# Patient Record
Sex: MALE | Race: WHITE | NOT HISPANIC OR LATINO | Employment: FULL TIME | URBAN - METROPOLITAN AREA
[De-identification: names, ages, dates, MRNs, and addresses within clinical notes are randomized per-mention and may not be internally consistent; named-entity substitution may affect disease eponyms.]

---

## 2021-11-26 ENCOUNTER — OFFICE VISIT (OUTPATIENT)
Dept: URGENT CARE | Facility: CLINIC | Age: 31
End: 2021-11-26
Payer: COMMERCIAL

## 2021-11-26 VITALS
TEMPERATURE: 97.8 F | SYSTOLIC BLOOD PRESSURE: 118 MMHG | RESPIRATION RATE: 18 BRPM | OXYGEN SATURATION: 98 % | DIASTOLIC BLOOD PRESSURE: 56 MMHG | HEART RATE: 88 BPM

## 2021-11-26 DIAGNOSIS — J06.9 ACUTE UPPER RESPIRATORY INFECTION: Primary | ICD-10-CM

## 2021-11-26 PROCEDURE — 99213 OFFICE O/P EST LOW 20 MIN: CPT | Performed by: NURSE PRACTITIONER

## 2021-11-26 PROCEDURE — 0241U HB NFCT DS VIR RESP RNA 4 TRGT: CPT | Performed by: NURSE PRACTITIONER

## 2021-11-28 ENCOUNTER — HOSPITAL ENCOUNTER (EMERGENCY)
Facility: HOSPITAL | Age: 31
Discharge: HOME | End: 2021-11-28
Attending: EMERGENCY MEDICINE | Admitting: EMERGENCY MEDICINE
Payer: COMMERCIAL

## 2021-11-28 VITALS
HEART RATE: 96 BPM | TEMPERATURE: 98.6 F | DIASTOLIC BLOOD PRESSURE: 60 MMHG | OXYGEN SATURATION: 98 % | RESPIRATION RATE: 18 BRPM | SYSTOLIC BLOOD PRESSURE: 149 MMHG

## 2021-11-28 DIAGNOSIS — Z13.220 SCREENING FOR HYPERLIPIDEMIA: ICD-10-CM

## 2021-11-28 DIAGNOSIS — R00.2 PALPITATIONS: Primary | ICD-10-CM

## 2021-11-28 DIAGNOSIS — R10.13 EPIGASTRIC DISCOMFORT: ICD-10-CM

## 2021-11-28 DIAGNOSIS — R42 LIGHTHEADED: ICD-10-CM

## 2021-11-28 LAB
ALBUMIN SERPL BCP-MCNC: 4.9 G/DL (ref 3.5–5)
ALP SERPL-CCNC: 60 U/L (ref 34–104)
ALT SERPL W P-5'-P-CCNC: 36 U/L (ref 7–52)
ANION GAP SERPL CALCULATED.3IONS-SCNC: 13 MMOL/L (ref 4–13)
AST SERPL W P-5'-P-CCNC: 28 U/L (ref 13–39)
BASOPHILS # BLD AUTO: 0.04 THOUSANDS/ΜL (ref 0–0.1)
BASOPHILS NFR BLD AUTO: 1 % (ref 0–1)
BILIRUB SERPL-MCNC: 1.03 MG/DL (ref 0.2–1)
BUN SERPL-MCNC: 15 MG/DL (ref 5–25)
CALCIUM SERPL-MCNC: 9.8 MG/DL (ref 8.4–10.2)
CARDIAC TROPONIN I PNL SERPL HS: <2 NG/L
CHLORIDE SERPL-SCNC: 101 MMOL/L (ref 96–108)
CO2 SERPL-SCNC: 23 MMOL/L (ref 21–32)
CREAT SERPL-MCNC: 1.07 MG/DL (ref 0.6–1.3)
EOSINOPHIL # BLD AUTO: 0.33 THOUSAND/ΜL (ref 0–0.61)
EOSINOPHIL NFR BLD AUTO: 5 % (ref 0–6)
ERYTHROCYTE [DISTWIDTH] IN BLOOD BY AUTOMATED COUNT: 12.9 % (ref 11.6–15.1)
GFR SERPL CREATININE-BSD FRML MDRD: 93 ML/MIN/1.73SQ M
GLUCOSE SERPL-MCNC: 116 MG/DL (ref 65–140)
HCT VFR BLD AUTO: 45 % (ref 36.5–49.3)
HGB BLD-MCNC: 14.5 G/DL (ref 12–17)
IMM GRANULOCYTES # BLD AUTO: 0.01 THOUSAND/UL (ref 0–0.2)
IMM GRANULOCYTES NFR BLD AUTO: 0 % (ref 0–2)
LYMPHOCYTES # BLD AUTO: 1.91 THOUSANDS/ΜL (ref 0.6–4.47)
LYMPHOCYTES NFR BLD AUTO: 28 % (ref 14–44)
MCH RBC QN AUTO: 28.4 PG (ref 26.8–34.3)
MCHC RBC AUTO-ENTMCNC: 32.2 G/DL (ref 31.4–37.4)
MCV RBC AUTO: 88 FL (ref 82–98)
MONOCYTES # BLD AUTO: 0.59 THOUSAND/ΜL (ref 0.17–1.22)
MONOCYTES NFR BLD AUTO: 9 % (ref 4–12)
NEUTROPHILS # BLD AUTO: 3.88 THOUSANDS/ΜL (ref 1.85–7.62)
NEUTS SEG NFR BLD AUTO: 57 % (ref 43–75)
NRBC BLD AUTO-RTO: 0 /100 WBCS
PLATELET # BLD AUTO: 260 THOUSANDS/UL (ref 149–390)
PMV BLD AUTO: 9.5 FL (ref 8.9–12.7)
POTASSIUM SERPL-SCNC: 3.5 MMOL/L (ref 3.5–5.3)
PROT SERPL-MCNC: 7.6 G/DL (ref 6.4–8.4)
RBC # BLD AUTO: 5.1 MILLION/UL (ref 3.88–5.62)
SODIUM SERPL-SCNC: 137 MMOL/L (ref 135–147)
WBC # BLD AUTO: 6.76 THOUSAND/UL (ref 4.31–10.16)

## 2021-11-28 PROCEDURE — 93005 ELECTROCARDIOGRAM TRACING: CPT | Performed by: NURSE PRACTITIONER

## 2021-11-28 PROCEDURE — 84484 ASSAY OF TROPONIN QUANT: CPT | Performed by: EMERGENCY MEDICINE

## 2021-11-28 PROCEDURE — 80053 COMPREHEN METABOLIC PANEL: CPT | Performed by: EMERGENCY MEDICINE

## 2021-11-28 PROCEDURE — 80061 LIPID PANEL: CPT

## 2021-11-28 PROCEDURE — 36415 COLL VENOUS BLD VENIPUNCTURE: CPT

## 2021-11-28 PROCEDURE — 84443 ASSAY THYROID STIM HORMONE: CPT

## 2021-11-28 PROCEDURE — 99285 EMERGENCY DEPT VISIT HI MDM: CPT

## 2021-11-28 PROCEDURE — 85025 COMPLETE CBC W/AUTO DIFF WBC: CPT | Performed by: EMERGENCY MEDICINE

## 2021-11-28 PROCEDURE — 99285 EMERGENCY DEPT VISIT HI MDM: CPT | Performed by: EMERGENCY MEDICINE

## 2021-11-28 RX ORDER — SUCRALFATE ORAL 1 G/10ML
1 SUSPENSION ORAL 4 TIMES DAILY PRN
Qty: 420 ML | Refills: 0 | Status: SHIPPED | OUTPATIENT
Start: 2021-11-28 | End: 2022-08-02

## 2021-11-29 LAB
FLUAV RNA RESP QL NAA+PROBE: NEGATIVE
FLUBV RNA RESP QL NAA+PROBE: NEGATIVE
RSV RNA RESP QL NAA+PROBE: NEGATIVE
SARS-COV-2 RNA RESP QL NAA+PROBE: NEGATIVE

## 2021-11-30 ENCOUNTER — OFFICE VISIT (OUTPATIENT)
Dept: FAMILY MEDICINE CLINIC | Facility: CLINIC | Age: 31
End: 2021-11-30
Payer: COMMERCIAL

## 2021-11-30 VITALS
HEIGHT: 67 IN | WEIGHT: 183 LBS | SYSTOLIC BLOOD PRESSURE: 116 MMHG | OXYGEN SATURATION: 98 % | TEMPERATURE: 98.2 F | HEART RATE: 63 BPM | BODY MASS INDEX: 28.72 KG/M2 | DIASTOLIC BLOOD PRESSURE: 70 MMHG

## 2021-11-30 DIAGNOSIS — G44.209 ACUTE NON INTRACTABLE TENSION-TYPE HEADACHE: ICD-10-CM

## 2021-11-30 DIAGNOSIS — Z13.220 SCREENING FOR HYPERLIPIDEMIA: ICD-10-CM

## 2021-11-30 DIAGNOSIS — K21.9 GASTROESOPHAGEAL REFLUX DISEASE WITHOUT ESOPHAGITIS: ICD-10-CM

## 2021-11-30 DIAGNOSIS — I44.1 MOBITZ TYPE 1 SECOND DEGREE AV BLOCK: ICD-10-CM

## 2021-11-30 DIAGNOSIS — R42 LIGHTHEADED: Primary | ICD-10-CM

## 2021-11-30 LAB
ATRIAL RATE: 78 BPM
CHOLEST SERPL-MCNC: 228 MG/DL
HDLC SERPL-MCNC: 50 MG/DL
LDLC SERPL CALC-MCNC: 139 MG/DL (ref 0–100)
NONHDLC SERPL-MCNC: 178 MG/DL
P AXIS: 68 DEGREES
PR INTERVAL: 214 MS
QRS AXIS: 78 DEGREES
QRSD INTERVAL: 102 MS
QT INTERVAL: 396 MS
QTC INTERVAL: 451 MS
T WAVE AXIS: 40 DEGREES
TRIGL SERPL-MCNC: 193 MG/DL
TSH SERPL DL<=0.05 MIU/L-ACNC: 1.76 UIU/ML (ref 0.45–5.33)
VENTRICULAR RATE: 78 BPM

## 2021-11-30 PROCEDURE — 93010 ELECTROCARDIOGRAM REPORT: CPT | Performed by: INTERNAL MEDICINE

## 2021-11-30 PROCEDURE — 99214 OFFICE O/P EST MOD 30 MIN: CPT | Performed by: FAMILY MEDICINE

## 2021-11-30 RX ORDER — NAPROXEN 500 MG/1
500 TABLET ORAL 2 TIMES DAILY WITH MEALS
Qty: 14 TABLET | Refills: 0 | Status: SHIPPED | OUTPATIENT
Start: 2021-11-30

## 2021-11-30 RX ORDER — PANTOPRAZOLE SODIUM 20 MG/1
20 TABLET, DELAYED RELEASE ORAL DAILY
Qty: 14 TABLET | Refills: 0 | Status: SHIPPED | OUTPATIENT
Start: 2021-11-30 | End: 2022-01-06

## 2021-12-01 ENCOUNTER — TELEPHONE (OUTPATIENT)
Dept: FAMILY MEDICINE CLINIC | Facility: CLINIC | Age: 31
End: 2021-12-01

## 2021-12-14 ENCOUNTER — OFFICE VISIT (OUTPATIENT)
Dept: FAMILY MEDICINE CLINIC | Facility: CLINIC | Age: 31
End: 2021-12-14
Payer: COMMERCIAL

## 2021-12-14 VITALS
OXYGEN SATURATION: 99 % | SYSTOLIC BLOOD PRESSURE: 120 MMHG | TEMPERATURE: 98.6 F | WEIGHT: 180 LBS | HEIGHT: 67 IN | HEART RATE: 68 BPM | BODY MASS INDEX: 28.25 KG/M2 | DIASTOLIC BLOOD PRESSURE: 78 MMHG

## 2021-12-14 DIAGNOSIS — F41.9 ANXIETY: Primary | ICD-10-CM

## 2021-12-14 PROCEDURE — 99214 OFFICE O/P EST MOD 30 MIN: CPT | Performed by: FAMILY MEDICINE

## 2021-12-15 ENCOUNTER — CLINICAL SUPPORT (OUTPATIENT)
Dept: CARDIOLOGY CLINIC | Facility: CLINIC | Age: 31
End: 2021-12-15
Payer: COMMERCIAL

## 2021-12-15 ENCOUNTER — OFFICE VISIT (OUTPATIENT)
Dept: CARDIOLOGY CLINIC | Facility: CLINIC | Age: 31
End: 2021-12-15
Payer: COMMERCIAL

## 2021-12-15 VITALS
OXYGEN SATURATION: 99 % | DIASTOLIC BLOOD PRESSURE: 68 MMHG | BODY MASS INDEX: 28.03 KG/M2 | SYSTOLIC BLOOD PRESSURE: 126 MMHG | WEIGHT: 178.6 LBS | HEART RATE: 86 BPM | HEIGHT: 67 IN | TEMPERATURE: 99.1 F

## 2021-12-15 DIAGNOSIS — R00.2 PALPITATIONS: ICD-10-CM

## 2021-12-15 DIAGNOSIS — I44.0 FIRST DEGREE AV BLOCK: ICD-10-CM

## 2021-12-15 DIAGNOSIS — R07.89 ATYPICAL CHEST PAIN: Primary | ICD-10-CM

## 2021-12-15 DIAGNOSIS — E78.5 HYPERLIPIDEMIA, UNSPECIFIED HYPERLIPIDEMIA TYPE: ICD-10-CM

## 2021-12-15 DIAGNOSIS — R00.2 PALPITATION: Primary | ICD-10-CM

## 2021-12-15 PROCEDURE — 99204 OFFICE O/P NEW MOD 45 MIN: CPT | Performed by: INTERNAL MEDICINE

## 2021-12-15 PROCEDURE — 93246 EXT ECG>7D<15D RECORDING: CPT | Performed by: INTERNAL MEDICINE

## 2021-12-15 PROCEDURE — 1036F TOBACCO NON-USER: CPT | Performed by: INTERNAL MEDICINE

## 2021-12-15 PROCEDURE — 3008F BODY MASS INDEX DOCD: CPT | Performed by: INTERNAL MEDICINE

## 2021-12-21 ENCOUNTER — HOSPITAL ENCOUNTER (OUTPATIENT)
Dept: NON INVASIVE DIAGNOSTICS | Facility: CLINIC | Age: 31
Discharge: HOME | End: 2021-12-21
Payer: COMMERCIAL

## 2021-12-21 VITALS
BODY MASS INDEX: 27.94 KG/M2 | WEIGHT: 178 LBS | HEIGHT: 67 IN | OXYGEN SATURATION: 100 % | RESPIRATION RATE: 16 BRPM | HEART RATE: 70 BPM | DIASTOLIC BLOOD PRESSURE: 70 MMHG | SYSTOLIC BLOOD PRESSURE: 114 MMHG

## 2021-12-21 DIAGNOSIS — E78.5 HYPERLIPIDEMIA, UNSPECIFIED HYPERLIPIDEMIA TYPE: ICD-10-CM

## 2021-12-21 DIAGNOSIS — R07.89 ATYPICAL CHEST PAIN: ICD-10-CM

## 2021-12-21 DIAGNOSIS — R00.2 PALPITATIONS: ICD-10-CM

## 2021-12-21 DIAGNOSIS — I44.0 FIRST DEGREE AV BLOCK: ICD-10-CM

## 2021-12-21 LAB
ARRHY DURING EX: NORMAL
CHEST PAIN STATEMENT: NORMAL
MAX DIASTOLIC BP: 84 MMHG
MAX HEART RATE: 166 BPM
MAX HR PERCENT: 87 %
MAX PREDICTED HEART RATE: 190 BPM
MAX. SYSTOLIC BP: 160 MMHG
PROTOCOL NAME: NORMAL
RATE PRESSURE PRODUCT: NORMAL
REASON FOR TERMINATION: NORMAL
SL CV STRESS RECOVERY BP: NORMAL MMHG
SL CV STRESS RECOVERY HR: 89 BPM
SL CV STRESS RECOVERY O2 SAT: 98 %
SL CV STRESS STAGE REACHED: 5
STRESS ANGINA INDEX: 0
STRESS BASELINE BP: NORMAL MMHG
STRESS BASELINE HR: 70 BPM
STRESS O2 SAT REST: 100 %
STRESS PEAK HR: 166 BPM
STRESS PERCENT HR: 87 %
STRESS POST ESTIMATED WORKLOAD: 15.3 METS
STRESS POST EXERCISE DUR MIN: 13 MIN
STRESS POST EXERCISE DUR SEC: 0 SEC
STRESS POST O2 SAT PEAK: 98 %
STRESS POST PEAK BP: 160 MMHG
STRESS TARGET HR: 166 BPM
TARGET HR FORMULA: NORMAL
TEST INDICATION: NORMAL
TIME IN EXERCISE PHASE: NORMAL

## 2021-12-21 PROCEDURE — 93016 CV STRESS TEST SUPVJ ONLY: CPT | Performed by: INTERNAL MEDICINE

## 2021-12-21 PROCEDURE — 93017 CV STRESS TEST TRACING ONLY: CPT

## 2021-12-21 PROCEDURE — 93018 CV STRESS TEST I&R ONLY: CPT | Performed by: INTERNAL MEDICINE

## 2021-12-29 ENCOUNTER — TELEPHONE (OUTPATIENT)
Dept: CARDIOLOGY CLINIC | Facility: CLINIC | Age: 31
End: 2021-12-29

## 2021-12-29 ENCOUNTER — CLINICAL SUPPORT (OUTPATIENT)
Dept: CARDIOLOGY CLINIC | Facility: CLINIC | Age: 31
End: 2021-12-29
Payer: COMMERCIAL

## 2021-12-29 DIAGNOSIS — R00.2 PALPITATIONS: ICD-10-CM

## 2021-12-29 DIAGNOSIS — R07.89 ATYPICAL CHEST PAIN: ICD-10-CM

## 2021-12-29 DIAGNOSIS — E78.5 HYPERLIPIDEMIA, UNSPECIFIED HYPERLIPIDEMIA TYPE: ICD-10-CM

## 2021-12-29 DIAGNOSIS — I44.0 FIRST DEGREE AV BLOCK: ICD-10-CM

## 2021-12-29 PROCEDURE — 93248 EXT ECG>7D<15D REV&INTERPJ: CPT | Performed by: INTERNAL MEDICINE

## 2022-01-06 ENCOUNTER — OFFICE VISIT (OUTPATIENT)
Dept: FAMILY MEDICINE CLINIC | Facility: CLINIC | Age: 32
End: 2022-01-06
Payer: COMMERCIAL

## 2022-01-06 VITALS
WEIGHT: 177 LBS | BODY MASS INDEX: 27.78 KG/M2 | SYSTOLIC BLOOD PRESSURE: 102 MMHG | OXYGEN SATURATION: 98 % | HEIGHT: 67 IN | TEMPERATURE: 97.4 F | DIASTOLIC BLOOD PRESSURE: 68 MMHG | HEART RATE: 61 BPM

## 2022-01-06 DIAGNOSIS — K21.9 GASTROESOPHAGEAL REFLUX DISEASE WITHOUT ESOPHAGITIS: ICD-10-CM

## 2022-01-06 DIAGNOSIS — F41.9 ANXIETY: Primary | ICD-10-CM

## 2022-01-06 DIAGNOSIS — R00.2 PALPITATIONS: ICD-10-CM

## 2022-01-06 PROCEDURE — 99214 OFFICE O/P EST MOD 30 MIN: CPT | Performed by: FAMILY MEDICINE

## 2022-01-06 RX ORDER — PANTOPRAZOLE SODIUM 20 MG/1
20 TABLET, DELAYED RELEASE ORAL DAILY
Qty: 30 TABLET | Refills: 1 | Status: SHIPPED | OUTPATIENT
Start: 2022-01-06 | End: 2022-02-17 | Stop reason: SDUPTHER

## 2022-01-06 RX ORDER — HYDROXYZINE HYDROCHLORIDE 25 MG/1
25 TABLET, FILM COATED ORAL EVERY 6 HOURS PRN
Qty: 14 TABLET | Refills: 0 | Status: SHIPPED | OUTPATIENT
Start: 2022-01-06

## 2022-01-06 NOTE — ASSESSMENT & PLAN NOTE
Symptoms are improving  Going to sleep  Discussed options  share decision-making decided on hydroxyzine p r n  at bedtime to help sleep  Will give short-term supply  If he is interested in continuing recommended calling when he is out so I can refill  Follow-up in 3 months

## 2022-01-06 NOTE — ASSESSMENT & PLAN NOTE
Recently seen by Cardiology  Trung Olivera did not show significant underlying arrhythmia  Echocardiogram scheduled for later this month

## 2022-01-06 NOTE — PROGRESS NOTES
Assessment/Plan:    Anxiety  Symptoms are improving  Going to sleep  Discussed options  share decision-making decided on hydroxyzine p r n  at bedtime to help sleep  Will give short-term supply  If he is interested in continuing recommended calling when he is out so I can refill  Follow-up in 3 months  Zohaibitz type 1 second degree AV block  Recently seen by Cardiology  Trung Olivera did not show significant underlying arrhythmia  Echocardiogram scheduled for later this month  Problem List Items Addressed This Visit        Digestive    Gastroesophageal reflux disease without esophagitis    Relevant Medications    pantoprazole (PROTONIX) 20 mg tablet       Other    Anxiety - Primary     Symptoms are improving  Going to sleep  Discussed options  share decision-making decided on hydroxyzine p r n  at bedtime to help sleep  Will give short-term supply  If he is interested in continuing recommended calling when he is out so I can refill  Follow-up in 3 months  Relevant Medications    hydrOXYzine HCL (ATARAX) 25 mg tablet            Subjective:      Patient ID: Gregor Lakhani is a 28 y o  male  Feeling much better  Headaches still there but much better  Has some lingering sxs  Hard to go to sleep   Takes longer to fall asleep sometimes  But this is better than before  Not sure why her cant sleep  No racing thoughts  Says maybe     Feeling an "internal vibration "       The following portions of the patient's history were reviewed and updated as appropriate: allergies, current medications, past family history, past medical history, past social history, past surgical history and problem list     Review of Systems   Constitutional: Negative for chills and fever  HENT: Negative for ear pain and sore throat  Eyes: Negative for pain and visual disturbance  Respiratory: Negative for cough and shortness of breath  Cardiovascular: Negative for chest pain and palpitations  Gastrointestinal: Negative for abdominal pain and vomiting  Genitourinary: Negative for dysuria and hematuria  Musculoskeletal: Negative for arthralgias and back pain  Skin: Negative for color change and rash  Neurological: Positive for headaches  Negative for seizures and syncope  Psychiatric/Behavioral: Positive for sleep disturbance  The patient is nervous/anxious  All other systems reviewed and are negative  Objective:      /68 (BP Location: Left arm, Patient Position: Sitting, Cuff Size: Standard)   Pulse 61   Temp (!) 97 4 °F (36 3 °C) (Tympanic)   Ht 5' 7" (1 702 m)   Wt 80 3 kg (177 lb)   SpO2 98%   BMI 27 72 kg/m²          Physical Exam  Vitals and nursing note reviewed  Constitutional:       General: He is not in acute distress  Appearance: Normal appearance  He is not ill-appearing, toxic-appearing or diaphoretic  Cardiovascular:      Rate and Rhythm: Normal rate and regular rhythm  Pulses: Normal pulses  Heart sounds: Normal heart sounds  Pulmonary:      Effort: Pulmonary effort is normal       Breath sounds: Normal breath sounds  Musculoskeletal:      Cervical back: Normal range of motion and neck supple  Neurological:      Mental Status: He is alert

## 2022-01-20 ENCOUNTER — CONSULT (OUTPATIENT)
Dept: GASTROENTEROLOGY | Facility: CLINIC | Age: 32
End: 2022-01-20
Payer: COMMERCIAL

## 2022-01-20 VITALS
OXYGEN SATURATION: 99 % | DIASTOLIC BLOOD PRESSURE: 80 MMHG | RESPIRATION RATE: 16 BRPM | BODY MASS INDEX: 28.16 KG/M2 | HEART RATE: 62 BPM | HEIGHT: 67 IN | TEMPERATURE: 98.3 F | SYSTOLIC BLOOD PRESSURE: 100 MMHG | WEIGHT: 179.4 LBS

## 2022-01-20 DIAGNOSIS — K21.9 GASTROESOPHAGEAL REFLUX DISEASE WITHOUT ESOPHAGITIS: Primary | ICD-10-CM

## 2022-01-20 PROCEDURE — 3008F BODY MASS INDEX DOCD: CPT | Performed by: INTERNAL MEDICINE

## 2022-01-20 PROCEDURE — 1036F TOBACCO NON-USER: CPT | Performed by: INTERNAL MEDICINE

## 2022-01-20 PROCEDURE — 99204 OFFICE O/P NEW MOD 45 MIN: CPT | Performed by: INTERNAL MEDICINE

## 2022-01-20 NOTE — PROGRESS NOTES
[facility] - Outpatient Consultation  Abdiel Newton 28 y o  male MRN: [id number]  Encounter: [id number]          ASSESSMENT AND PLAN:      1  Gastroesophageal reflux disease without esophagitis    This is a 81-year-old white male with a history of burning in the throat and globus sensation  Globus sensation is usually due to anxiety however about 30% of patients with globus sensation also have esophageal reflux  Additionally as other symptoms suggestive of esophageal reflux such as burning in his throat  Since his symptoms are not improving that much on a low dose of pantoprazole I will schedule him for an endoscopy to further evaluate his symptoms  Risks of perforation bleeding were discussed with the patient he is agreeable to proceed with the procedure  Thank you so much for referring this patient   ______________________________________________________________________    HPI:  Helen Diallo is a 81-year-old white male with a history of burning in his throat when he wakes up since Thanksgiving  He also has a feeling of globus sensation  He denies any nausea, vomiting or abdominal pain  The patient has tried pantoprazole with only some mild improvement in his symptoms  The patient also takes NSAIDs daily for headaches  He denies any alcohol or smoking use  He denies any dysphagia  REVIEW OF SYSTEMS:    CONSTITUTIONAL: Denies any fever, chills, rigors, and weight loss  HEENT: No earache or tinnitus  Denies hearing loss or visual disturbances  CARDIOVASCULAR: No chest pain or palpitations  RESPIRATORY: Denies any cough, hemoptysis, shortness of breath or dyspnea on exertion  GASTROINTESTINAL: As noted in the History of Present Illness  GENITOURINARY: No problems with urination  Denies any hematuria or dysuria  NEUROLOGIC: No dizziness or vertigo, denies headaches  MUSCULOSKELETAL: Denies any muscle or joint pain  SKIN: Denies skin rashes or itching  ENDOCRINE: Denies excessive thirst  Denies intolerance to heat or cold  PSYCHOSOCIAL: Denies depression or anxiety  Denies any recent memory loss  Historical Information   Past Medical History:   Diagnosis Date    Anxiety 12/14/2021    Chest pain      Past Surgical History:   Procedure Laterality Date    DENTAL IMPLANT       Social History   Social History     Substance and Sexual Activity   Alcohol Use Not Currently     Social History     Substance and Sexual Activity   Drug Use Never     Social History     Tobacco Use   Smoking Status Never Smoker   Smokeless Tobacco Never Used     Family History   Problem Relation Age of Onset    Thyroid disease Mother     No Known Problems Father        Meds/Allergies       Current Outpatient Medications:     fluticasone (FLONASE) 50 mcg/act nasal spray    hydrOXYzine HCL (ATARAX) 25 mg tablet    naproxen (Naprosyn) 500 mg tablet    pantoprazole (PROTONIX) 20 mg tablet    sucralfate (CARAFATE) 1 g/10 mL suspension    No Known Allergies        Objective     Blood pressure 100/80, pulse 62, temperature 98 3 °F (36 8 °C), resp  rate 16, height 5' 7" (1 702 m), weight 81 4 kg (179 lb 6 4 oz), SpO2 99 %  Body mass index is 28 1 kg/m²  PHYSICAL EXAM:      General Appearance:   Alert, cooperative, no distress   HEENT:   Normocephalic, atraumatic, anicteric  Neck:  Supple, symmetrical, trachea midline   Lungs:   Clear to auscultation bilaterally; no rales, rhonchi or wheezing; respirations unlabored    Heart[de-identified]   Regular rate and rhythm; no murmur, rub, or gallop  Abdomen:   Soft, non-tender, non-distended; normal bowel sounds; no masses, no organomegaly    Genitalia:   Deferred    Rectal:   Deferred    Extremities:  No cyanosis, clubbing or edema    Pulses:  2+ and symmetric    Skin:  No jaundice, rashes, or lesions    Lymph nodes:  No palpable cervical lymphadenopathy        Lab Results:   No visits with results within 1 Day(s) from this visit     Latest known visit with results is:   Hospital Outpatient Visit on 12/21/2021   Component Date Value    Baseline HR 12/21/2021 70     Baseline BP 12/21/2021 114/70     O2 sat rest 12/21/2021 100     Stress peak HR 12/21/2021 166     Post peak BP 12/21/2021 160     Rate Pressure Product 12/21/2021 26,560 0     O2 sat peak 12/21/2021 98     Recovery HR 12/21/2021 89     Recovery BP 12/21/2021 130/76     O2 sat recovery 12/21/2021 98     Target HR 12/21/2021 166     Percent HR 12/21/2021 87     Exercise duration (min) 12/21/2021 13     Exercise duration (sec) 12/21/2021 0     Estimated workload 12/21/2021 15 3     Angina Index 12/21/2021 0     Stress Stage Reached 12/21/2021 5 0     Max HR Percent 12/21/2021 87     Protocol Name 12/21/2021 TABBY     Time In Exercise Phase 12/21/2021 00:13:00     MAX  SYSTOLIC BP 24/87/0690 016     Max Diastolic Bp 21/83/9559 84     Max Heart Rate 12/21/2021 166     Max Predicted Heart Rate 12/21/2021 190     Reason for Termination 12/21/2021 Target Heart Rate Achieved     Test Indication 12/21/2021 Screening for CAD     Target Hr Formular 12/21/2021 (220 - Age)*85%     Arrhy During Ex 12/21/2021 atrial premature beats     Chest Pain Statement 12/21/2021 none          Radiology Results:   Stress test only, exercise    Result Date: 12/21/2021  Narrative: [facility]  Stress ECG: Arrhythmias during recovery: rare PACs  The stress ECG is negative for ischemia after maximal exercise, without reproduction of symptoms  Normal study  Results reviewed with patient       Stress strip    Result Date: 12/21/2021  Narrative: Confirmed by RAFAEL DREW ([id number]),  Darryl Mercado ([id number]) on 12/21/2021 2:49:14 PM

## 2022-01-24 NOTE — PATIENT INSTRUCTIONS
Scheduled date of EGD(as of today):2/7/22  Physician performing EGD:Brenton  Location of EGD:[place]  Instructions reviewed with patient by:Randa  Clearances: none

## 2022-01-31 ENCOUNTER — HOSPITAL ENCOUNTER (OUTPATIENT)
Dept: NON INVASIVE DIAGNOSTICS | Facility: HOSPITAL | Age: 32
Discharge: HOME | End: 2022-01-31
Payer: COMMERCIAL

## 2022-01-31 VITALS
HEART RATE: 59 BPM | SYSTOLIC BLOOD PRESSURE: 118 MMHG | HEIGHT: 67 IN | DIASTOLIC BLOOD PRESSURE: 60 MMHG | BODY MASS INDEX: 28.09 KG/M2 | WEIGHT: 179 LBS

## 2022-01-31 DIAGNOSIS — R07.89 ATYPICAL CHEST PAIN: ICD-10-CM

## 2022-01-31 DIAGNOSIS — I44.0 FIRST DEGREE AV BLOCK: ICD-10-CM

## 2022-01-31 DIAGNOSIS — E78.5 HYPERLIPIDEMIA, UNSPECIFIED HYPERLIPIDEMIA TYPE: ICD-10-CM

## 2022-01-31 DIAGNOSIS — R00.2 PALPITATIONS: ICD-10-CM

## 2022-01-31 LAB
AORTIC ROOT: 3.3 CM
AORTIC VALVE MEAN VELOCITY: 6.8 M/S
APICAL FOUR CHAMBER EJECTION FRACTION: 62 %
ASCENDING AORTA: 2.8 CM (ref 2.02–3.02)
AV AREA BY CONTINUOUS VTI: 2.6 CM2
AV AREA PEAK VELOCITY: 2.9 CM2
AV LVOT MEAN GRADIENT: 1 MMHG
AV LVOT PEAK GRADIENT: 2 MMHG
AV MEAN GRADIENT: 2 MMHG
AV PEAK GRADIENT: 4 MMHG
AV VALVE AREA: 2.62 CM2
AV VELOCITY RATIO: 0.77
DOP CALC AO PEAK VEL: 1.01 M/S
DOP CALC AO VTI: 24.29 CM
DOP CALC LVOT AREA: 3.8 CM2
DOP CALC LVOT DIAMETER: 2.2 CM
DOP CALC LVOT PEAK VEL VTI: 16.75 CM
DOP CALC LVOT PEAK VEL: 0.78 M/S
DOP CALC LVOT STROKE INDEX: 34.2 ML/M2
DOP CALC LVOT STROKE VOLUME: 63.64 CM3
E WAVE DECELERATION TIME: 206 MS
FRACTIONAL SHORTENING: 33 % (ref 28–44)
INTERVENTRICULAR SEPTUM IN DIASTOLE (PARASTERNAL SHORT AXIS VIEW): 1 CM (ref 0.53–0.99)
LEFT ATRIUM AREA SYSTOLE SINGLE PLANE A4C: 16.2 CM2
LEFT ATRIUM SIZE: 3.3 CM
LEFT INTERNAL DIMENSION IN SYSTOLE: 3.3 CM (ref 2.1–4)
LEFT VENTRICULAR INTERNAL DIMENSION IN DIASTOLE: 4.9 CM (ref 4.69–6.98)
LEFT VENTRICULAR POSTERIOR WALL IN END DIASTOLE: 0.9 CM (ref 0.52–0.98)
LEFT VENTRICULAR STROKE VOLUME: 69 ML
MV E'TISSUE VEL-SEP: 16 CM/S
MV PEAK A VEL: 0.43 M/S
MV PEAK E VEL: 89 CM/S
RIGHT ATRIUM AREA SYSTOLE A4C: 13.3 CM2
RIGHT VENTRICLE ID DIMENSION: 3.1 CM
SL CV LV EF: 60
SL CV PED ECHO LEFT VENTRICLE DIASTOLIC VOLUME (MOD BIPLANE) 2D: 114 ML
SL CV PED ECHO LEFT VENTRICLE SYSTOLIC VOLUME (MOD BIPLANE) 2D: 45 ML
TR MAX PG: 16 MMHG
TRICUSPID VALVE PEAK REGURGITATION VELOCITY: 2 M/S
Z-SCORE OF ASCENDING AORTA: 1.11
Z-SCORE OF INTERVENTRICULAR SEPTUM IN END DIASTOLE: 2.01
Z-SCORE OF LEFT VENTRICULAR DIMENSION IN END SYSTOLE: -1.54
Z-SCORE OF LEFT VENTRICULAR POSTERIOR WALL IN END DIASTOLE: 1.28

## 2022-01-31 PROCEDURE — 93306 TTE W/DOPPLER COMPLETE: CPT | Performed by: INTERNAL MEDICINE

## 2022-01-31 PROCEDURE — 93306 TTE W/DOPPLER COMPLETE: CPT

## 2022-02-02 ENCOUNTER — TELEPHONE (OUTPATIENT)
Dept: CARDIOLOGY CLINIC | Facility: CLINIC | Age: 32
End: 2022-02-02

## 2022-02-07 ENCOUNTER — ANESTHESIA EVENT (OUTPATIENT)
Dept: GASTROENTEROLOGY | Facility: HOSPITAL | Age: 32
End: 2022-02-07

## 2022-02-07 ENCOUNTER — HOSPITAL ENCOUNTER (OUTPATIENT)
Dept: GASTROENTEROLOGY | Facility: HOSPITAL | Age: 32
Setting detail: OUTPATIENT SURGERY
Discharge: HOME | End: 2022-02-07
Admitting: INTERNAL MEDICINE
Payer: COMMERCIAL

## 2022-02-07 ENCOUNTER — ANESTHESIA (OUTPATIENT)
Dept: GASTROENTEROLOGY | Facility: HOSPITAL | Age: 32
End: 2022-02-07

## 2022-02-07 VITALS
OXYGEN SATURATION: 98 % | SYSTOLIC BLOOD PRESSURE: 133 MMHG | HEIGHT: 67 IN | WEIGHT: 179 LBS | RESPIRATION RATE: 18 BRPM | BODY MASS INDEX: 28.09 KG/M2 | DIASTOLIC BLOOD PRESSURE: 80 MMHG | TEMPERATURE: 98.1 F | HEART RATE: 55 BPM

## 2022-02-07 DIAGNOSIS — K21.9 GASTROESOPHAGEAL REFLUX DISEASE WITHOUT ESOPHAGITIS: ICD-10-CM

## 2022-02-07 PROCEDURE — 88305 TISSUE EXAM BY PATHOLOGIST: CPT | Performed by: PATHOLOGY

## 2022-02-07 PROCEDURE — 43239 EGD BIOPSY SINGLE/MULTIPLE: CPT | Performed by: INTERNAL MEDICINE

## 2022-02-07 RX ORDER — PROPOFOL 10 MG/ML
INJECTION, EMULSION INTRAVENOUS AS NEEDED
Status: DISCONTINUED | OUTPATIENT
Start: 2022-02-07 | End: 2022-02-07

## 2022-02-07 RX ORDER — SODIUM CHLORIDE, SODIUM LACTATE, POTASSIUM CHLORIDE, CALCIUM CHLORIDE 600; 310; 30; 20 MG/100ML; MG/100ML; MG/100ML; MG/100ML
125 INJECTION, SOLUTION INTRAVENOUS CONTINUOUS
Status: DISCONTINUED | OUTPATIENT
Start: 2022-02-07 | End: 2022-02-11 | Stop reason: HOSPADM

## 2022-02-07 RX ADMIN — SODIUM CHLORIDE, SODIUM LACTATE, POTASSIUM CHLORIDE, AND CALCIUM CHLORIDE: .6; .31; .03; .02 INJECTION, SOLUTION INTRAVENOUS at 08:39

## 2022-02-07 RX ADMIN — PROPOFOL 50 MG: 10 INJECTION, EMULSION INTRAVENOUS at 09:19

## 2022-02-07 RX ADMIN — PROPOFOL 50 MG: 10 INJECTION, EMULSION INTRAVENOUS at 09:17

## 2022-02-07 RX ADMIN — PROPOFOL 100 MG: 10 INJECTION, EMULSION INTRAVENOUS at 09:16

## 2022-02-07 NOTE — H&P
History and Physical - [facility]  Woody Atkins 28 y o  male MRN: [id number]                  HPI: Woody Atkins is a 28y o  year old male who presents for globus sensation and symptoms of esophageal reflux  REVIEW OF SYSTEMS: Per the HPI, and otherwise unremarkable  Historical Information   Past Medical History:   Diagnosis Date    Anxiety 12/14/2021    Chest pain     GERD (gastroesophageal reflux disease)      Past Surgical History:   Procedure Laterality Date    DENTAL IMPLANT       Social History   Social History     Substance and Sexual Activity   Alcohol Use Not Currently     Social History     Substance and Sexual Activity   Drug Use Never     Social History     Tobacco Use   Smoking Status Never Smoker   Smokeless Tobacco Never Used     Family History   Problem Relation Age of Onset    Thyroid disease Mother     No Known Problems Father        Meds/Allergies       Current Outpatient Medications:     pantoprazole (PROTONIX) 20 mg tablet    sucralfate (CARAFATE) 1 g/10 mL suspension    fluticasone (FLONASE) 50 mcg/act nasal spray    hydrOXYzine HCL (ATARAX) 25 mg tablet    naproxen (Naprosyn) 500 mg tablet    Current Facility-Administered Medications:     lactated ringers infusion, 125 mL/hr, Intravenous, Continuous, New Bag at 02/07/22 0839    No Known Allergies    Objective     /60   Pulse 56   Temp 98 °F (36 7 °C) (Temporal)   Resp 16   Ht 5' 7" (1 702 m)   Wt 81 2 kg (179 lb)   SpO2 99%   BMI 28 04 kg/m²       PHYSICAL EXAM    Gen: NAD  Head: NCAT  CV: RRR  CHEST: Clear  ABD: soft, NT/ND  EXT: no edema      ASSESSMENT/PLAN:  This is a 28y o  year old male here for endoscopy, and he is stable and optimized for his procedure

## 2022-02-07 NOTE — ANESTHESIA PREPROCEDURE EVALUATION
Procedure:  EGD    Relevant Problems   CARDIO   (+) Chest pain   (+) Mobitz type 1 second degree AV block      GI/HEPATIC   (+) Gastroesophageal reflux disease without esophagitis      NEURO/PSYCH   (+) Acute non intractable tension-type headache   (+) Anxiety      Lab Results   Component Value Date    WBC 6 76 11/28/2021    HGB 14 5 11/28/2021    HCT 45 0 11/28/2021    MCV 88 11/28/2021     11/28/2021     Lab Results   Component Value Date     08/20/2014    K 3 5 11/28/2021    CO2 23 11/28/2021     11/28/2021    BUN 15 11/28/2021    CREATININE 1 07 11/28/2021     Lab Results   Component Value Date    GLUCOSE 84 08/20/2014    GLUCOSE 82 06/19/2014     Physical Exam    Airway    Mallampati score: II  TM Distance: >3 FB  Neck ROM: full     Dental       Cardiovascular      Pulmonary      Other Findings        Anesthesia Plan  ASA Score- 2     Anesthesia Type- IV sedation with anesthesia with ASA Monitors  Additional Monitors:   Airway Plan:           Plan Factors-Exercise tolerance (METS): >4 METS  Chart reviewed  Existing labs reviewed  Patient summary reviewed  Induction- intravenous  Postoperative Plan-     Informed Consent- Anesthetic plan and risks discussed with patient  I personally reviewed this patient with the CRNA  Discussed and agreed on the Anesthesia Plan with the CRNA  Kenneth Hubbard

## 2022-02-09 ENCOUNTER — OFFICE VISIT (OUTPATIENT)
Dept: CARDIOLOGY CLINIC | Facility: CLINIC | Age: 32
End: 2022-02-09
Payer: COMMERCIAL

## 2022-02-09 VITALS
TEMPERATURE: 97.8 F | WEIGHT: 181 LBS | RESPIRATION RATE: 16 BRPM | HEART RATE: 68 BPM | OXYGEN SATURATION: 98 % | SYSTOLIC BLOOD PRESSURE: 108 MMHG | BODY MASS INDEX: 28.41 KG/M2 | DIASTOLIC BLOOD PRESSURE: 70 MMHG | HEIGHT: 67 IN

## 2022-02-09 DIAGNOSIS — I44.0 FIRST DEGREE AV BLOCK: ICD-10-CM

## 2022-02-09 DIAGNOSIS — I51.7 RIGHT VENTRICULAR DILATION: ICD-10-CM

## 2022-02-09 DIAGNOSIS — I45.10 INCOMPLETE RBBB: ICD-10-CM

## 2022-02-09 DIAGNOSIS — R00.2 PALPITATIONS: ICD-10-CM

## 2022-02-09 DIAGNOSIS — Q25.79 CONGENITAL DILATION OF PULMONARY ARTERY: ICD-10-CM

## 2022-02-09 DIAGNOSIS — E78.5 HYPERLIPIDEMIA, UNSPECIFIED HYPERLIPIDEMIA TYPE: ICD-10-CM

## 2022-02-09 DIAGNOSIS — R07.9 CHEST PAIN, UNSPECIFIED TYPE: Primary | ICD-10-CM

## 2022-02-09 PROCEDURE — 99214 OFFICE O/P EST MOD 30 MIN: CPT | Performed by: INTERNAL MEDICINE

## 2022-02-09 NOTE — PROGRESS NOTES
Cardiology Consultation     Zuleyma Duenas  [id number]  1990  [facility] Edd Nagy 20      1  Chest pain, unspecified type     2  Palpitations     3  Right ventricular dilation  MRI cardiac  w wo contrast   4  Congenital dilation of pulmonary artery  MRI cardiac  w wo contrast   5  Incomplete RBBB  MRI cardiac  w wo contrast   6  First degree AV block         Discussion/Summary:  1  Atypical chest pain-improved with dietary changes  2  RV dilation with low normal function  3  Pulmonary artery dilation  4  Incomplete right bundle-branch block  5  First-degree AV block  6  HLD      -exercise stress ECG showing no ischemic changes with rare PACs and patient able to exercise for 13 minutes with no significant symptoms   -Holter monitor reviewed showing average heart rate 65 beats per minute with predominantly sinus rhythm and rare supraventricular and ventricular ectopy with 6 patient triggered events correlating with sinus rhythm heart rate 68-86 beats per minute  -transthoracic echocardiogram 01/31/2022 showing left ventricular systolic function normal estimated LVEF 60% with a mildly dilated right ventricle with low-normal systolic function mild pulmonic regurgitation and a mildly dilated main pulmonary artery  -in the setting of above structural abnormalities will have patient undergo cardiac MRI for definitive evaluation for any structural issues    -consult patient on dietary modifications hyperlipidemia and can recheck prior to next visit  -patient counseled on dietary lifestyle modifications  -will see patient in 2 months after testing is complete  -patient counseled if he were to have any warning or alarm type symptoms he should seek emergency medical care immediately    History of Present Illness:  - patient is a 41-year-old male with esophageal reflux disease, first-degree AV block, palpitations, hyperlipidemia who initially presented to the emergency department in November of 2021 for evaluation of palpitations and burning chest discomfort on the right side which developed while riding in a car with his mom  Patient was seen and evaluated in the ER and ECG performed at that time showed sinus rhythm first-degree AV block, incomplete right bundle-branch block and high sensitivity troponin was negative   -since last office visit patient notes some improvement in his discomfort in overall symptoms and notes that he has been paying attention more and his discomfort seems to only be associated with spicy foods or acidic foods and is not exertional   -currently at this time he denies any active symptoms overall notes feeling well      Patient Active Problem List   Diagnosis    Gastroesophageal reflux disease without esophagitis    Acute non intractable tension-type headache    Lightheaded    Mobitz type 1 second degree AV block    Anxiety    Chest pain    Palpitations     Past Medical History:   Diagnosis Date    Anxiety 12/14/2021    Chest pain     GERD (gastroesophageal reflux disease)      Social History     Socioeconomic History    Marital status: Single     Spouse name: Not on file    Number of children: Not on file    Years of education: Not on file    Highest education level: Not on file   Occupational History    Not on file   Tobacco Use    Smoking status: Never Smoker    Smokeless tobacco: Never Used   Vaping Use    Vaping Use: Never used   Substance and Sexual Activity    Alcohol use: Not Currently    Drug use: Never    Sexual activity: Not Currently   Other Topics Concern    Not on file   Social History Narrative    Not on file     Social Determinants of Health     Financial Resource Strain: Not on file   Food Insecurity: Not on file   Transportation Needs: Not on file   Physical Activity: Not on file   Stress: Not on file   Social Connections: Not on file   Intimate Partner Violence: Not on file   Housing Stability: Not on file      Family History   Problem Relation Age of Onset    Thyroid disease Mother     No Known Problems Father      Past Surgical History:   Procedure Laterality Date    DENTAL IMPLANT         Current Outpatient Medications:     fluticasone (FLONASE) 50 mcg/act nasal spray, 2 sprays into each nostril daily (Patient taking differently: 2 sprays into each nostril daily as needed  ), Disp: 1 Bottle, Rfl: 11    hydrOXYzine HCL (ATARAX) 25 mg tablet, Take 1 tablet (25 mg total) by mouth every 6 (six) hours as needed for itching, Disp: 14 tablet, Rfl: 0    naproxen (Naprosyn) 500 mg tablet, Take 1 tablet (500 mg total) by mouth 2 (two) times a day with meals (Patient taking differently: Take 500 mg by mouth 2 (two) times a day with meals As needed ), Disp: 14 tablet, Rfl: 0    pantoprazole (PROTONIX) 20 mg tablet, Take 1 tablet (20 mg total) by mouth daily, Disp: 30 tablet, Rfl: 1    sucralfate (CARAFATE) 1 g/10 mL suspension, Take 10 mL (1 g total) by mouth 4 (four) times a day as needed (epigastric discomfort) for up to 7 days, Disp: 420 mL, Rfl: 0  No current facility-administered medications for this visit      Facility-Administered Medications Ordered in Other Visits:     lactated ringers infusion, 125 mL/hr, Intravenous, Continuous, Ambreen Gaitan MD, Stopped at 02/07/22 6450  No Known Allergies      Labs:  Hospital Outpatient Visit on 02/07/2022   Component Date Value    Case Report 02/07/2022                      Value:Surgical Pathology Report                         Case: [id number]                                   Authorizing Provider:  Luz Coppola MD        Collected:           02/07/2022 7868              Ordering Location:     [facility] Received:            02/07/2022 0366                                     Endoscopy Pathologist:           Tamica Simons MD                                                                 Specimens:   A) - Stomach, antral bx r/o h pylori                                                                B) - Jejunum, cardiac jejunum bx                                                           Final Diagnosis 02/07/2022                      Value: This result contains rich text formatting which cannot be displayed here   Note 02/07/2022                      Value: This result contains rich text formatting which cannot be displayed here   Additional Information 02/07/2022                      Value: This result contains rich text formatting which cannot be displayed here  Andrew Hobbs Gross Description 02/07/2022                      Value: This result contains rich text formatting which cannot be displayed here     Hospital Outpatient Visit on 01/31/2022   Component Date Value    AV area peak mychal 01/31/2022 2 9     LA size 01/31/2022 3 3     Aortic valve mean veloci* 01/31/2022 6 80     Triscuspid Valve Regurgi* 01/31/2022 16 0     Tricuspid valve peak reg* 01/31/2022 2 00     LVPWd 01/31/2022 0 90     MV E' Tissue Velocity Se* 01/31/2022 16     IVSd 01/31/2022 5 20     LV DIASTOLIC VOLUME (MOD* 88/86/8196 114     LEFT VENTRICLE SYSTOLIC * 61/70/7231 45     Left ventricular stroke * 01/31/2022 69 00     A4C EF 01/31/2022 62     LVIDd 01/31/2022 4 90     LVIDS 01/31/2022 3 30     FS 01/31/2022 33     Asc Ao 01/31/2022 2 8     Ao root 01/31/2022 3 30     RVID d 01/31/2022 3 1     LVOT mn grad 01/31/2022 1 0     AV area by cont VTI 01/31/2022 2 6     AV mean gradient 01/31/2022 2     AV LVOT peak gradient 01/31/2022 2     E wave deceleration time 01/31/2022 206     LVOT diameter 01/31/2022 2 2     LVOT peak mychal 01/31/2022 0 78     LVOT peak VTI 01/31/2022 16 75     Ao peak mychal retrograde 01/31/2022 1 01     Ao VTI 01/31/2022 24 29     LVOT stroke volume 01/31/2022 63 64  AV peak gradient 01/31/2022 4     MV Peak E Sheldon 01/31/2022 89     MV Peak A Sheldon 01/31/2022 0 43     JULIETH A4C 01/31/2022 16 2     RAA A4C 01/31/2022 13 3     LVOT SI 01/31/2022 34 20     LVOT area 01/31/2022 3 80     AV Velocity Ratio 01/31/2022 0 77     AV valve area 01/31/2022 2 62     Ao asc z-score 01/31/2022 1 11     ZLVPWD 01/31/2022 1 28     ZLVIDS 01/31/2022 -1 54     ZIVSD 01/31/2022 2 01     LV EF 01/31/2022 2600 [facility] Outpatient Visit on 12/21/2021   Component Date Value    Baseline HR 12/21/2021 70     Baseline BP 12/21/2021 114/70     O2 sat rest 12/21/2021 100     Stress peak HR 12/21/2021 166     Post peak BP 12/21/2021 160     Rate Pressure Product 12/21/2021 26,560 0     O2 sat peak 12/21/2021 98     Recovery HR 12/21/2021 89     Recovery BP 12/21/2021 130/76     O2 sat recovery 12/21/2021 98     Target HR 12/21/2021 166     Percent HR 12/21/2021 87     Exercise duration (min) 12/21/2021 13     Exercise duration (sec) 12/21/2021 0     Estimated workload 12/21/2021 15 3     Angina Index 12/21/2021 0     Stress Stage Reached 12/21/2021 5 0     Max HR Percent 12/21/2021 87     Protocol Name 12/21/2021 TABBY     Time In Exercise Phase 12/21/2021 00:13:00     MAX   SYSTOLIC BP 75/84/2450 776     Max Diastolic Bp 07/17/2004 84     Max Heart Rate 12/21/2021 166     Max Predicted Heart Rate 12/21/2021 190     Reason for Termination 12/21/2021 Target Heart Rate Achieved     Test Indication 12/21/2021 Screening for CAD     Target Hr Formular 12/21/2021 (220 - Age)*85%     Arrhy During Ex 12/21/2021 atrial premature beats     Chest Pain Statement 12/21/2021 none         Imaging: EGD    Result Date: 2/7/2022  Narrative: [place] [address] [phone] DATE OF SERVICE: 2/07/22 PHYSICIAN(S): Sylvester Reza MD Proceduralist INDICATION: Gastroesophageal reflux disease without esophagitis POST-OP DIAGNOSIS: See the impression below  PREPROCEDURE: Informed consent was obtained for the procedure, including sedation  Risks of perforation, hemorrhage, adverse drug reaction and aspiration were discussed  The patient was placed in the left lateral decubitus position  Patient was explained about the risks and benefits of the procedure  Risks including but not limited to bleeding, infection, and perforation were explained in detail  Also explained about less than 100% sensitivity with the exam and other alternatives  DETAILS OF PROCEDURE: Patient was taken to the procedure room where a time out was performed to confirm correct patient and correct procedure  The patient underwent monitored anesthesia care, which was administered by an anesthesia professional  The patient's blood pressure, heart rate, level of consciousness, respirations and oxygen were monitored throughout the procedure  The scope was advanced to the second part of the duodenum  Retroflexion was performed in the fundus  The patient experienced no blood loss  The procedure was not difficult  The patient tolerated the procedure well  There were no apparent complications  ANESTHESIA INFORMATION: ASA: II Anesthesia Type: IV Sedation with Anesthesia MEDICATIONS: No administrations occurring from 0912 to 0923 on 02/07/22 FINDINGS: The body of the stomach, antrum and duodenum appeared normal  Performed biopsies to rule out H  pylori in the antrum Moderate petechial mucosa in the cardia; performed cold forceps biopsy The esophagus appeared normal  Z-line is 40 cm from the incisors  SPECIMENS: ID Type Source Tests Collected by Time Destination 1 : antral bx r/o h pylori Tissue Stomach TISSUE EXAM Truong Alcocer MD 2/7/2022  9:18 AM  2 : cardiac jejunum bx  Tissue Jejunum TISSUE EXAM Truong Alcocer MD 2/7/2022  9:20 AM      Impression:  The body of the stomach, antrum and duodenum appeared normal  Performed biopsies to rule out H  pylori in the antrum Moderate petechial mucosa in the cardia; performed cold forceps biopsy The esophagus appeared normal  Z-line is 40 cm from the incisors  RECOMMENDATION: Await pathology results   Azael Lopez MD     Echo complete w/ contrast if indicated    Result Date: 1/31/2022  Narrative: [facility] [place]  Left Ventricle: Left ventricular cavity size is normal  The left ventricular ejection fraction is 60%  Systolic function is normal  Wall motion is normal  Diastolic function is normal    Right Ventricle: Right ventricular cavity size is mildly dilated  Systolic function is low normal    Pulmonic Valve: There is mild regurgitation    Pulmonary Artery: The main pulmonary artery is mildly dilated  Review of Systems:  Review of Systems   Constitutional: Negative for chills, diaphoresis, fatigue, fever and unexpected weight change  HENT: Negative for trouble swallowing and voice change  Eyes: Negative for pain and redness  Respiratory: Negative for shortness of breath and wheezing  Cardiovascular: Negative for chest pain, palpitations and leg swelling  Gastrointestinal: Negative for abdominal pain, constipation, diarrhea, nausea and vomiting  Genitourinary: Negative for dysuria  Musculoskeletal: Negative for neck pain and neck stiffness  Skin: Negative for rash  Neurological: Negative for dizziness, syncope, light-headedness and headaches  Psychiatric/Behavioral: Negative for agitation and confusion  All other systems reviewed and are negative  Vitals:    02/09/22 1457   BP: 108/70   Pulse: 68   Resp: 16   Temp: 97 8 °F (36 6 °C)   SpO2: 98%   Weight: 82 1 kg (181 lb)   Height: 5' 7" (1 702 m)     Vitals:    02/09/22 1457   Weight: 82 1 kg (181 lb)     Height: 5' 7" (170 2 cm)     Physical Exam:  Physical Exam  Vitals reviewed  Constitutional:       General: He is not in acute distress  Appearance: Normal appearance  He is not diaphoretic  HENT:      Head: Normocephalic and atraumatic     Eyes: General:         Right eye: No discharge  Left eye: No discharge  Neck:      Comments: Trachea midline, no JVD present  Cardiovascular:      Rate and Rhythm: Normal rate and regular rhythm  Heart sounds: No friction rub  Pulmonary:      Effort: Pulmonary effort is normal  No respiratory distress  Breath sounds: Normal breath sounds  No wheezing  Abdominal:      General: Bowel sounds are normal       Palpations: Abdomen is soft  Tenderness: There is no abdominal tenderness  Musculoskeletal:      Right lower leg: No edema  Left lower leg: No edema  Skin:     General: Skin is warm and dry  Neurological:      Mental Status: He is alert        Comments: Awake, alert, able to answer questions appropriately, able to move extremities bilaterally   Psychiatric:         Mood and Affect: Mood normal          Behavior: Behavior normal

## 2022-02-17 ENCOUNTER — OFFICE VISIT (OUTPATIENT)
Dept: GASTROENTEROLOGY | Facility: CLINIC | Age: 32
End: 2022-02-17
Payer: COMMERCIAL

## 2022-02-17 VITALS
RESPIRATION RATE: 16 BRPM | SYSTOLIC BLOOD PRESSURE: 106 MMHG | WEIGHT: 179.4 LBS | DIASTOLIC BLOOD PRESSURE: 68 MMHG | HEART RATE: 58 BPM | HEIGHT: 67 IN | OXYGEN SATURATION: 99 % | TEMPERATURE: 98.6 F | BODY MASS INDEX: 28.16 KG/M2

## 2022-02-17 DIAGNOSIS — R09.89 GLOBUS SENSATION: Primary | ICD-10-CM

## 2022-02-17 DIAGNOSIS — R07.0 BURNING SENSATION OF THROAT: ICD-10-CM

## 2022-02-17 PROCEDURE — 1036F TOBACCO NON-USER: CPT | Performed by: FAMILY MEDICINE

## 2022-02-17 PROCEDURE — 99213 OFFICE O/P EST LOW 20 MIN: CPT | Performed by: FAMILY MEDICINE

## 2022-02-17 PROCEDURE — 3008F BODY MASS INDEX DOCD: CPT | Performed by: FAMILY MEDICINE

## 2022-02-17 RX ORDER — PANTOPRAZOLE SODIUM 40 MG/1
40 TABLET, DELAYED RELEASE ORAL DAILY
Qty: 30 TABLET | Refills: 1 | Status: SHIPPED | OUTPATIENT
Start: 2022-02-17

## 2022-02-17 NOTE — PATIENT INSTRUCTIONS
GERD (Gastroesophageal Reflux Disease)   AMBULATORY CARE:   Gastroesophageal reflux disease (GERD)  is reflux that occurs more than twice a week for a few weeks  Reflux means acid and food in the stomach back up into the esophagus  It usually causes heartburn and other symptoms  GERD can cause other health problems over time if it is not treated  Signs and symptoms:   · Heartburn (burning pain in your chest)    · Pain after meals that spreads to your neck, jaw, or shoulder    · Pain that gets better when you change positions    · Bitter or acid taste in your mouth    · A dry cough    · Trouble swallowing or pain with swallowing    · Hoarseness or a sore throat    · Burping or hiccups    · Feeling full soon after you start eating    Call your local emergency number (911 in the [address] [facility]) if:   · You have severe chest pain and sudden trouble breathing  Seek care immediately if:   · You have trouble breathing after you vomit  · You have trouble swallowing, or pain with swallowing  · Your bowel movements are black, bloody, or tarry-looking  · Your vomit looks like coffee grounds or has blood in it  Call your doctor or gastroenterologist if:   · You feel full and cannot burp or vomit  · You vomit large amounts, or you vomit often  · You are losing weight without trying  · Your symptoms get worse or do not improve with treatment  · You have questions or concerns about your condition or care  Treatment for GERD:   · Medicines  are used to decrease stomach acid  Medicine may also be used to help your lower esophageal sphincter and stomach contract (tighten) more  · Surgery  is done to wrap the upper part of the stomach around the esophageal sphincter  This will strengthen the sphincter and prevent reflux  Manage GERD:       · Do not have foods or drinks that may increase heartburn    These include chocolate, peppermint, fried or fatty foods, drinks that contain caffeine, or carbonated drinks (soda)  Other foods include spicy foods, onions, tomatoes, and tomato-based foods  Do not have foods or drinks that can irritate your esophagus, such as citrus fruits, juices, and alcohol  · Do not eat large meals  When you eat a lot of food at one time, your stomach needs more acid to digest it  Eat 6 small meals each day instead of 3 large meals, and eat slowly  Do not eat meals 2 to 3 hours before bedtime  · Elevate the head of your bed  Place 6-inch blocks under the head of your bed frame  You may also use more than one pillow under your head and shoulders while you sleep  · Maintain a healthy weight  If you are overweight, weight loss may help relieve symptoms of GERD  · Do not smoke  Smoking weakens the lower esophageal sphincter and increases the risk of GERD  Ask your healthcare provider for information if you currently smoke and need help to quit  E-cigarettes or smokeless tobacco still contain nicotine  Talk to your healthcare provider before you use these products  · Do not wear clothing that is tight around your waist   Tight clothing can put pressure on your stomach and cause or worsen GERD symptoms  Follow up with your doctor or gastroenterologist as directed:  Write down your questions so you remember to ask them during your visits  © Copyright [employer] 2021 Information is for End User's use only and may not be sold, redistributed or otherwise used for commercial purposes  All illustrations and images included in CareNotes® are the copyrighted property of [employer]  or Heena Neal  The above information is an  only  It is not intended as medical advice for individual conditions or treatments  Talk to your doctor, nurse or pharmacist before following any medical regimen to see if it is safe and effective for you

## 2022-02-17 NOTE — PROGRESS NOTES
[facility] - Outpatient Follow-up Note  Woody Shultz 28 y o  male MRN: [id number]  Encounter: [id number]          ASSESSMENT AND PLAN:      1  Globus sensation  2  Burning sensation of throat  Patient with feeling of burning sensation in his throat, globus sensation, and now with persistent cough/tackle, believed to be an atypical presentation of acid reflux  Patient has been evaluated by cardiology and ENT with predominantly unremarkable workup - Although patient is scheduled for cardiac MRI for definitive evaluation  Given patient did not see much improvement with low-dose PPI, prescribed pantoprazole 40 mg once daily  Patient also requesting to have his vitamin B12 and vitamin-D levels checked, not unreasonable given sxs's, orders placed  Advised patient to adhere to a strict GERD diet by avoiding traditional triggers such as spicy, saucy, citrus, greasy/fatty/fried, carbonated, caffeinated, or alcoholic foods and beverages  If patient does not see improvement in symptoms with high dose PPI, would consider manometry w/ pH testing to confirm or deny suspicion for reflux  Follow-up in 6 weeks  ______________________________________________________________________    SUBJECTIVE: Patient is a 28 y o  male with PMH significant for second-degree AV block (Mobitz type 1) anxiety, and GERD who presents today for follow-up regarding feeling of burning in his throat and globus sensation  Patient was previously seen by Dr Silvestre Byrd  Patient with feeling of burning in his throat and globus sensation since 11/2021 - Patient does note some increased stress at work during this time  Previously taking pantoprazole 20 mg once daily, with only mild improvement in symptoms  It was recommended that patient have EGD for further evaluation   EGD on 02/07/2022 with normal appearing body of stomach, antrum, and duodenum; biopsies to rule out H pylori; moderate petechial mucosa in the cardia s/p cold forceps biopsy; normal-appearing esophagus Z-line is 40 cm from incisors  Biopsies negative for H pylori  Today, patient continues to endorse feeling of burning in his troat and globus sensation, as well as, pressure in his head, nausea/tingling sensation after eating, and a persistent cough/tickle in his throat  Also admits to left-sided chest pain after drinking coffee  Has seen cardiology with workup including ECG with rare PACs, Holter monitor with rare supraventricular and ventricular ectopy, transthoracic echocardiogram with mildly dialated main pulmonary artery - Patient is scheduled for a cardiac MRI for definitive evaluation  Has also been seen by ENT with evaluation by laryngoscopy which showed mild arytenoid erythema - Noted his sxs are likely due to reflux  Has also spoken with PCP regarding his sxs and was told it may be residual stress  Denies nausea/vomiting, abdominal pain, constipation, diarrhea, no blood in his stools/black and tarry stools  Denies unintentional weight loss  REVIEW OF SYSTEMS IS OTHERWISE NEGATIVE        Historical Information   Past Medical History:   Diagnosis Date    Anxiety 12/14/2021    Chest pain     GERD (gastroesophageal reflux disease)      Past Surgical History:   Procedure Laterality Date    DENTAL IMPLANT       Social History   Social History     Substance and Sexual Activity   Alcohol Use Not Currently     Social History     Substance and Sexual Activity   Drug Use Never     Social History     Tobacco Use   Smoking Status Never Smoker   Smokeless Tobacco Never Used     Family History   Problem Relation Age of Onset    Thyroid disease Mother     No Known Problems Father        Meds/Allergies       Current Outpatient Medications:     fluticasone (FLONASE) 50 mcg/act nasal spray    hydrOXYzine HCL (ATARAX) 25 mg tablet    naproxen (Naprosyn) 500 mg tablet    pantoprazole (PROTONIX) 20 mg tablet    sucralfate (CARAFATE) 1 g/10 mL suspension    No Known Allergies        Objective     There were no vitals taken for this visit  There is no height or weight on file to calculate BMI  PHYSICAL EXAM:      General Appearance:   Alert, cooperative, no distress   HEENT:   Normocephalic, atraumatic, anicteric  Neck:  Supple, symmetrical, trachea midline   Lungs:   Clear to auscultation bilaterally; no rales, rhonchi or wheezing; respirations unlabored    Heart[de-identified]   Regular rate and rhythm; no murmur, rub, or gallop  Abdomen:   Soft, non-tender, non-distended; normal bowel sounds; no masses, no organomegaly    Genitalia:   Deferred    Rectal:   Deferred    Extremities:  No cyanosis, clubbing or edema    Pulses:  2+ and symmetric    Skin:  No jaundice, rashes, or lesions    Lymph nodes:  No palpable cervical lymphadenopathy        Lab Results:   No visits with results within 1 Day(s) from this visit  Latest known visit with results is:   Hospital Outpatient Visit on 02/07/2022   Component Date Value    Case Report 02/07/2022                      Value:Surgical Pathology Report                         Case: [id number]                                   Authorizing Provider:  Zahra Saeed MD        Collected:           02/07/2022 [id number]              Ordering Location:     [facility] Received:            02/07/2022 1819                                     Endoscopy                                                                    Pathologist:           Rula Brown MD                                                                 Specimens:   A) - Stomach, antral bx r/o h pylori                                                                B) - Jejunum, cardiac jejunum bx                                                           Final Diagnosis 02/07/2022                      Value: This result contains rich text formatting which cannot be displayed here   Note 02/07/2022                      Value: This result contains rich text formatting which cannot be displayed here   Additional Information 02/07/2022                      Value: This result contains rich text formatting which cannot be displayed here  [facility] Description 02/07/2022                      Value: This result contains rich text formatting which cannot be displayed here  Radiology Results:   EGD    Result Date: 2/7/2022  Narrative: Elayne Hernández [id number] [phone] DATE OF SERVICE: 2/07/22 PHYSICIAN(S): Rasheed Garcia MD Proceduralist INDICATION: Gastroesophageal reflux disease without esophagitis POST-OP DIAGNOSIS: See the impression below  PREPROCEDURE: Informed consent was obtained for the procedure, including sedation  Risks of perforation, hemorrhage, adverse drug reaction and aspiration were discussed  The patient was placed in the left lateral decubitus position  Patient was explained about the risks and benefits of the procedure  Risks including but not limited to bleeding, infection, and perforation were explained in detail  Also explained about less than 100% sensitivity with the exam and other alternatives  DETAILS OF PROCEDURE: Patient was taken to the procedure room where a time out was performed to confirm correct patient and correct procedure  The patient underwent monitored anesthesia care, which was administered by an anesthesia professional  The patient's blood pressure, heart rate, level of consciousness, respirations and oxygen were monitored throughout the procedure  The scope was advanced to the second part of the duodenum  Retroflexion was performed in the fundus  The patient experienced no blood loss  The procedure was not difficult  The patient tolerated the procedure well  There were no apparent complications   ANESTHESIA INFORMATION: ASA: II Anesthesia Type: IV Sedation with Anesthesia MEDICATIONS: No administrations occurring from 0912 to 0923 on 02/07/22 FINDINGS: The body of the stomach, antrum and duodenum appeared normal  Performed biopsies to rule out H  pylori in the antrum Moderate petechial mucosa in the cardia; performed cold forceps biopsy The esophagus appeared normal  Z-line is 40 cm from the incisors  SPECIMENS: ID Type Source Tests Collected by Time Destination 1 : antral bx r/o h pylori Tissue Stomach TISSUE EXAM Elizabeth Real MD 2/7/2022  9:18 AM  2 : cardiac jejunum bx  Tissue Jejunum TISSUE EXAM Elizabeth Real MD 2/7/2022  9:20 AM      Impression: The body of the stomach, antrum and duodenum appeared normal  Performed biopsies to rule out H  pylori in the antrum Moderate petechial mucosa in the cardia; performed cold forceps biopsy The esophagus appeared normal  Z-line is 40 cm from the incisors  RECOMMENDATION: Await pathology results   Elizabeth Real MD     Echo complete w/ contrast if indicated    Result Date: 1/31/2022  Narrative: Cortney Tariq  Left Ventricle: Left ventricular cavity size is normal  The left ventricular ejection fraction is 60%  Systolic function is normal  Wall motion is normal  Diastolic function is normal    Right Ventricle: Right ventricular cavity size is mildly dilated  Systolic function is low normal    Pulmonic Valve: There is mild regurgitation    Pulmonary Artery: The main pulmonary artery is mildly dilated

## 2022-02-22 NOTE — TELEPHONE ENCOUNTER
I called and spoke with patient regarding results of recent transthoracic echocardiogram which showed normal left ventricular systolic function estimated LVEF 60% with but appears to be mildly dilated right ventricle with low normal function and pulmonary artery at upper normal size  I recommended patient undergo cardiac MRI for further evaluation however at this time patient wishes to think about this as he is feeling okay at this time and will let me know decision at office visit next week on 02/09/2022  Patient is a 66y old  Male who presents with a chief complaint of Glioblastoma (21 Feb 2022 10:40)      SUBJECTIVE / OVERNIGHT EVENTS:  Pt seen and examined at bedside. No acute events overnight.  Pt denies cp, palpitations, sob, abd pain, N/V, fever, chills.    ROS:  All other review of systems negative    Allergies    No Known Allergies    Intolerances        MEDICATIONS  (STANDING):  atorvastatin 20 milliGRAM(s) Oral at bedtime  dexAMETHasone     Tablet 2 milliGRAM(s) Oral two times a day  dextrose 40% Gel 15 Gram(s) Oral once  dextrose 5%. 1000 milliLiter(s) (50 mL/Hr) IV Continuous <Continuous>  dextrose 5%. 1000 milliLiter(s) (100 mL/Hr) IV Continuous <Continuous>  dextrose 50% Injectable 25 Gram(s) IV Push once  dextrose 50% Injectable 12.5 Gram(s) IV Push once  dextrose 50% Injectable 25 Gram(s) IV Push once  enoxaparin Injectable 40 milliGRAM(s) SubCutaneous daily  escitalopram 5 milliGRAM(s) Oral daily  fluticasone propionate 50 MICROgram(s)/spray Nasal Spray 1 Spray(s) Both Nostrils two times a day  gabapentin 100 milliGRAM(s) Oral at bedtime  glucagon  Injectable 1 milliGRAM(s) IntraMuscular once  insulin glargine Injectable (LANTUS) 16 Unit(s) SubCutaneous at bedtime  insulin lispro (ADMELOG) corrective regimen sliding scale   SubCutaneous three times a day before meals  insulin lispro Injectable (ADMELOG) 5 Unit(s) SubCutaneous three times a day before meals  lacosamide Solution 100 milliGRAM(s) Oral two times a day  metFORMIN 1000 milliGRAM(s) Oral two times a day  pantoprazole    Tablet 40 milliGRAM(s) Oral before breakfast  polyethylene glycol 3350 17 Gram(s) Oral every 12 hours  senna 2 Tablet(s) Oral at bedtime  sodium chloride 1 Gram(s) Oral every 8 hours    MEDICATIONS  (PRN):  acetaminophen     Tablet .. 650 milliGRAM(s) Oral every 6 hours PRN Temp greater or equal to 38C (100.4F), Mild Pain (1 - 3)  benzocaine 15 mG/menthol 3.6 mG Lozenge 1 Lozenge Oral every 4 hours PRN Sore Throat      Vital Signs Last 24 Hrs  T(C): 36.9 (21 Feb 2022 19:47), Max: 36.9 (21 Feb 2022 19:47)  T(F): 98.5 (21 Feb 2022 19:47), Max: 98.5 (21 Feb 2022 19:47)  HR: 80 (21 Feb 2022 19:47) (80 - 80)  BP: 116/74 (21 Feb 2022 19:47) (116/74 - 116/74)  BP(mean): --  RR: 16 (21 Feb 2022 19:47) (16 - 16)  SpO2: 95% (21 Feb 2022 19:47) (95% - 95%)  CAPILLARY BLOOD GLUCOSE      POCT Blood Glucose.: 132 mg/dL (22 Feb 2022 07:18)  POCT Blood Glucose.: 109 mg/dL (21 Feb 2022 21:50)  POCT Blood Glucose.: 155 mg/dL (21 Feb 2022 16:13)  POCT Blood Glucose.: 176 mg/dL (21 Feb 2022 11:08)    I&O's Summary      PHYSICAL EXAM:  GENERAL: NAD, well-developed  HEAD:  Surgical incision site c/d/i s/p staple removal  NECK: Supple, No JVD  CHEST/LUNG: Clear to auscultation bilaterally; No wheeze, nonlabored breathing  HEART: Regular rate and rhythm; No murmurs, rubs, or gallops  ABDOMEN: Soft, Nontender, Nondistended; Bowel sounds present  EXTREMITIES:  2+ Peripheral Pulses, No clubbing, cyanosis, or edema  NEUROLOGY: AAOx3, non-focal  PSYCH: calm, appropriate mood    LABS:                        11.8   6.58  )-----------( 145      ( 21 Feb 2022 06:41 )             36.9     02-21    137  |  99  |  19  ----------------------------<  196<H>  4.1   |  30  |  0.99    Ca    9.0      21 Feb 2022 06:41    TPro  6.8  /  Alb  2.5<L>  /  TBili  0.5  /  DBili  x   /  AST  9<L>  /  ALT  18  /  AlkPhos  70  02-21              RADIOLOGY & ADDITIONAL TESTS:  Results Reviewed:   Imaging Personally Reviewed:  Electrocardiogram Personally Reviewed:    COORDINATION OF CARE:  Care Discussed with Consultants/Other Providers [Y/N]:  Prior or Outpatient Records Reviewed [Y/N]:

## 2022-02-23 ENCOUNTER — APPOINTMENT (OUTPATIENT)
Dept: LAB | Facility: CLINIC | Age: 32
End: 2022-02-23
Payer: COMMERCIAL

## 2022-02-23 DIAGNOSIS — R09.89 GLOBUS SENSATION: ICD-10-CM

## 2022-02-23 DIAGNOSIS — R07.0 BURNING SENSATION OF THROAT: ICD-10-CM

## 2022-02-23 LAB
25(OH)D3 SERPL-MCNC: 53.1 NG/ML (ref 30–100)
VIT B12 SERPL-MCNC: 639 PG/ML (ref 100–900)

## 2022-02-23 PROCEDURE — 82607 VITAMIN B-12: CPT

## 2022-02-23 PROCEDURE — 36415 COLL VENOUS BLD VENIPUNCTURE: CPT

## 2022-02-23 PROCEDURE — 82306 VITAMIN D 25 HYDROXY: CPT

## 2022-03-04 ENCOUNTER — TELEPHONE (OUTPATIENT)
Dept: NEUROLOGY | Facility: CLINIC | Age: 32
End: 2022-03-04

## 2022-03-11 ENCOUNTER — HOSPITAL ENCOUNTER (OUTPATIENT)
Dept: RADIOLOGY | Facility: HOSPITAL | Age: 32
Discharge: HOME | End: 2022-03-11
Attending: INTERNAL MEDICINE
Payer: COMMERCIAL

## 2022-03-11 DIAGNOSIS — Q25.79 CONGENITAL DILATION OF PULMONARY ARTERY: ICD-10-CM

## 2022-03-11 DIAGNOSIS — I45.10 INCOMPLETE RBBB: ICD-10-CM

## 2022-03-11 DIAGNOSIS — I51.7 RIGHT VENTRICULAR DILATION: ICD-10-CM

## 2022-03-11 PROCEDURE — A9585 GADOBUTROL INJECTION: HCPCS | Performed by: INTERNAL MEDICINE

## 2022-03-11 PROCEDURE — 75561 CARDIAC MRI FOR MORPH W/DYE: CPT

## 2022-03-11 PROCEDURE — G1004 CDSM NDSC: HCPCS

## 2022-03-11 RX ADMIN — GADOBUTROL 16 ML: 604.72 INJECTION INTRAVENOUS at 19:38

## 2022-03-21 ENCOUNTER — OFFICE VISIT (OUTPATIENT)
Dept: FAMILY MEDICINE CLINIC | Facility: CLINIC | Age: 32
End: 2022-03-21
Payer: COMMERCIAL

## 2022-03-21 VITALS
HEIGHT: 67 IN | BODY MASS INDEX: 28.25 KG/M2 | SYSTOLIC BLOOD PRESSURE: 110 MMHG | HEART RATE: 65 BPM | OXYGEN SATURATION: 99 % | DIASTOLIC BLOOD PRESSURE: 70 MMHG | WEIGHT: 180 LBS | TEMPERATURE: 97.6 F

## 2022-03-21 DIAGNOSIS — G44.209 TENSION HEADACHE: ICD-10-CM

## 2022-03-21 DIAGNOSIS — R51.9 INTRACTABLE HEADACHE, UNSPECIFIED CHRONICITY PATTERN, UNSPECIFIED HEADACHE TYPE: ICD-10-CM

## 2022-03-21 DIAGNOSIS — Z11.59 ENCOUNTER FOR HEPATITIS C SCREENING TEST FOR LOW RISK PATIENT: Primary | ICD-10-CM

## 2022-03-21 DIAGNOSIS — F41.9 ANXIETY: ICD-10-CM

## 2022-03-21 DIAGNOSIS — Z11.4 SCREENING FOR HUMAN IMMUNODEFICIENCY VIRUS: ICD-10-CM

## 2022-03-21 DIAGNOSIS — R07.9 CHEST PAIN, UNSPECIFIED TYPE: ICD-10-CM

## 2022-03-21 PROCEDURE — 3008F BODY MASS INDEX DOCD: CPT | Performed by: FAMILY MEDICINE

## 2022-03-21 PROCEDURE — 99214 OFFICE O/P EST MOD 30 MIN: CPT | Performed by: FAMILY MEDICINE

## 2022-03-21 PROCEDURE — 1036F TOBACCO NON-USER: CPT | Performed by: FAMILY MEDICINE

## 2022-03-21 RX ORDER — AMITRIPTYLINE HYDROCHLORIDE 10 MG/1
10 TABLET, FILM COATED ORAL
Qty: 90 TABLET | Refills: 0 | Status: SHIPPED | OUTPATIENT
Start: 2022-03-21

## 2022-03-21 NOTE — ASSESSMENT & PLAN NOTE
Had recent EGD  Biopsies negative for H pylori  Appearance of esophagus stomach and duodenum was normal   GERD symptoms have resolved

## 2022-03-21 NOTE — PROGRESS NOTES
Assessment/Plan:    Tension headache  Headaches are tension type complicated by medication overuse headaches  No red flags  Will start amitriptyline 10 mg for prophylaxis  Limit OTC pain medications  Follow-up in 6 weeks  Had long discussion with patient did not believe CT scan reasonable at this time  Will reassess at follow-up in consider at that time  Gastroesophageal reflux disease without esophagitis  Had recent EGD  Biopsies negative for H pylori  Appearance of esophagus stomach and duodenum was normal   GERD symptoms have resolved  Chest pain  Denies any atypical chest pain today  Has been following with Cardiology  Cardiologic workup up to this point has been unrevealing including Cardiac MRI which only showed mild left atrial enlargement  Problem List Items Addressed This Visit        Other    Tension headache     Headaches are tension type complicated by medication overuse headaches  No red flags  Will start amitriptyline 10 mg for prophylaxis  Limit OTC pain medications  Follow-up in 6 weeks  Had long discussion with patient did not believe CT scan reasonable at this time  Will reassess at follow-up in consider at that time  Relevant Medications    amitriptyline (ELAVIL) 10 mg tablet    Anxiety    Chest pain     Denies any atypical chest pain today  Has been following with Cardiology  Cardiologic workup up to this point has been unrevealing including Cardiac MRI which only showed mild left atrial enlargement             Other Visit Diagnoses     Encounter for hepatitis C screening test for low risk patient    -  Primary    Relevant Orders    Hepatitis C antibody    Screening for human immunodeficiency virus        Relevant Orders    HIV 1/2 Antigen/Antibody (4th Generation) w Reflex [facility]    Intractable headache, unspecified chronicity pattern, unspecified headache type        Relevant Medications    amitriptyline (ELAVIL) 10 mg tablet    Other Relevant Orders    EARL Screen w/ Reflex to Titer/Pattern    Sedimentation rate, automated            Subjective:      Patient ID: Zuleyma Duenas is a 28 y o  male  Presents to office for headache and other various complaints  He would like to know if CT scan of his head can be ordered to rule out causes of his headaches  Has been having headaches for a few months  Describes initial headaches as bandlike tightness around head  Has been noticing morning headaches or last few weeks  No nausea vomiting or neurological symptoms with morning headaches  Has been taking Tylenol and Advil to help with headaches  Tries to alter so not taking too much of 1 drug but does take over-the-counter medication very frequently almost at every day  He is concerned headaches are related to dental implant   He has follow-up with dentist who did not think had anything to do with it  He also gets pressure in his ears and crackling noises  Has been seen by ENT for possible eustachian tube dysfunction  Admitstingling feeling in both arms after eating  Not last long goes away on its own  Not related to certain kind of food  No weakness            The following portions of the patient's history were reviewed and updated as appropriate: allergies, current medications, past family history, past medical history, past social history, past surgical history and problem list     Review of Systems      Objective:      /70 (BP Location: Left arm, Patient Position: Sitting)   Pulse 65   Temp 97 6 °F (36 4 °C)   Ht 5' 7" (1 702 m)   Wt 81 6 kg (180 lb)   SpO2 99%   BMI 28 19 kg/m²          Physical Exam

## 2022-03-21 NOTE — ASSESSMENT & PLAN NOTE
Denies any atypical chest pain today  Has been following with Cardiology  Cardiologic workup up to this point has been unrevealing including Cardiac MRI which only showed mild left atrial enlargement

## 2022-03-21 NOTE — ASSESSMENT & PLAN NOTE
Headaches are tension type complicated by medication overuse headaches  No red flags  Will start amitriptyline 10 mg for prophylaxis  Limit OTC pain medications  Follow-up in 6 weeks  Had long discussion with patient did not believe CT scan reasonable at this time  Will reassess at follow-up in consider at that time

## 2022-04-12 ENCOUNTER — APPOINTMENT (OUTPATIENT)
Dept: LAB | Facility: CLINIC | Age: 32
End: 2022-04-12
Payer: COMMERCIAL

## 2022-04-12 DIAGNOSIS — R51.9 INTRACTABLE HEADACHE, UNSPECIFIED CHRONICITY PATTERN, UNSPECIFIED HEADACHE TYPE: ICD-10-CM

## 2022-04-12 DIAGNOSIS — Z11.59 ENCOUNTER FOR HEPATITIS C SCREENING TEST FOR LOW RISK PATIENT: ICD-10-CM

## 2022-04-12 DIAGNOSIS — E78.5 HYPERLIPIDEMIA, UNSPECIFIED HYPERLIPIDEMIA TYPE: ICD-10-CM

## 2022-04-12 DIAGNOSIS — Z11.4 SCREENING FOR HUMAN IMMUNODEFICIENCY VIRUS: ICD-10-CM

## 2022-04-12 LAB
CHOLEST SERPL-MCNC: 196 MG/DL
ERYTHROCYTE [SEDIMENTATION RATE] IN BLOOD: 7 MM/HOUR (ref 0–14)
HCV AB SER QL: NORMAL
HDLC SERPL-MCNC: 54 MG/DL
LDLC SERPL CALC-MCNC: 127 MG/DL (ref 0–100)
TRIGL SERPL-MCNC: 73 MG/DL

## 2022-04-12 PROCEDURE — 36415 COLL VENOUS BLD VENIPUNCTURE: CPT

## 2022-04-12 PROCEDURE — 85652 RBC SED RATE AUTOMATED: CPT

## 2022-04-12 PROCEDURE — 86803 HEPATITIS C AB TEST: CPT

## 2022-04-12 PROCEDURE — 86038 ANTINUCLEAR ANTIBODIES: CPT

## 2022-04-12 PROCEDURE — 80061 LIPID PANEL: CPT

## 2022-04-12 PROCEDURE — 87389 HIV-1 AG W/HIV-1&-2 AB AG IA: CPT

## 2022-04-13 ENCOUNTER — OFFICE VISIT (OUTPATIENT)
Dept: CARDIOLOGY CLINIC | Facility: CLINIC | Age: 32
End: 2022-04-13
Payer: COMMERCIAL

## 2022-04-13 VITALS
HEIGHT: 67 IN | HEART RATE: 75 BPM | RESPIRATION RATE: 16 BRPM | TEMPERATURE: 98.1 F | OXYGEN SATURATION: 98 % | BODY MASS INDEX: 27.94 KG/M2 | DIASTOLIC BLOOD PRESSURE: 78 MMHG | SYSTOLIC BLOOD PRESSURE: 106 MMHG | WEIGHT: 178 LBS

## 2022-04-13 DIAGNOSIS — R00.2 PALPITATIONS: ICD-10-CM

## 2022-04-13 DIAGNOSIS — R07.9 CHEST PAIN, UNSPECIFIED TYPE: Primary | ICD-10-CM

## 2022-04-13 DIAGNOSIS — E78.5 HYPERLIPIDEMIA, UNSPECIFIED HYPERLIPIDEMIA TYPE: ICD-10-CM

## 2022-04-13 DIAGNOSIS — I44.1 MOBITZ TYPE 1 SECOND DEGREE AV BLOCK: ICD-10-CM

## 2022-04-13 LAB
HIV 1+2 AB+HIV1 P24 AG SERPL QL IA: NORMAL
RYE IGE QN: NEGATIVE

## 2022-04-13 PROCEDURE — 3008F BODY MASS INDEX DOCD: CPT | Performed by: INTERNAL MEDICINE

## 2022-04-13 PROCEDURE — 99214 OFFICE O/P EST MOD 30 MIN: CPT | Performed by: INTERNAL MEDICINE

## 2022-04-13 PROCEDURE — 1036F TOBACCO NON-USER: CPT | Performed by: INTERNAL MEDICINE

## 2022-04-13 NOTE — PROGRESS NOTES
Cardiology Follow Up    Katty Cherry  [id number]  1990  [facility] [place]  [address]  [id number] [phone]      1  Chest pain, unspecified type     2  Palpitations     3  Mobitz type 1 second degree AV block     4  Hyperlipidemia, unspecified hyperlipidemia type         Discussion/Summary:  1  Atypical chest pain-improved  2  RV dilation-seem to be normal in cardiac MRI  3  Incomplete right bundle-branch block  4  First-degree AV block  5  Hyperlipidemia  6  Intermittent palpitations-improved      -cardiac MRI performed 03/11/2022 shows normal left ventricular and right ventricular size and function with no evidence of ARVD and no significant valvular abnormalities as well as no significant hyperenhancement on delayed gadolinium imaging   -most recent lipid panel 04/12/2020 to show some improvement with total cholesterol 196, triglycerides 73, HDL 54,   -patient will continue aggressive dietary and lifestyle modifications  -will continue monitor patient clinically at this time will see patient in 1 year or sooner if necessary  -patient counseled if he were to have any warning or alarm type symptoms he should seek emergency medical care immediately    History of Present Illness:  - patient is a 36-year-old male with esophageal reflux disease, first-degree AV block, intermittent palpitations and hyperlipidemia who initially presented to the emergency department in November of 2021 for evaluation of palpitations and burning chest discomfort on the right side which developed while riding in a car with his mom  After being seen and evaluated in the ER in ECG performed with high sensitivity troponins being negative he was send for office visit in follow-up    -since last office visit he denies any significant issues and has overall had significant improvement in his discomfort and symptoms  Currently at this time he denies any active chest pain, palpitations, lightheadedness or dizziness, loss of consciousness or shortness of breath        Patient Active Problem List   Diagnosis    Gastroesophageal reflux disease without esophagitis    Tension headache    Lightheaded    Mobitz type 1 second degree AV block    Anxiety    Chest pain    Palpitations     Past Medical History:   Diagnosis Date    Anxiety 12/14/2021    Chest pain     GERD (gastroesophageal reflux disease)      Social History     Socioeconomic History    Marital status: Single     Spouse name: Not on file    Number of children: Not on file    Years of education: Not on file    Highest education level: Not on file   Occupational History    Not on file   Tobacco Use    Smoking status: Never Smoker    Smokeless tobacco: Never Used   Vaping Use    Vaping Use: Never used   Substance and Sexual Activity    Alcohol use: Not Currently    Drug use: Never    Sexual activity: Not Currently   Other Topics Concern    Not on file   Social History Narrative    Not on file     Social Determinants of Health     Financial Resource Strain: Not on file   Food Insecurity: Not on file   Transportation Needs: Not on file   Physical Activity: Not on file   Stress: Not on file   Social Connections: Not on file   Intimate Partner Violence: Not on file   Housing Stability: Not on file      Family History   Problem Relation Age of Onset    Thyroid disease Mother     No Known Problems Father      Past Surgical History:   Procedure Laterality Date    DENTAL IMPLANT         Current Outpatient Medications:     amitriptyline (ELAVIL) 10 mg tablet, Take 1 tablet (10 mg total) by mouth daily at bedtime, Disp: 90 tablet, Rfl: 0    fluticasone (FLONASE) 50 mcg/act nasal spray, 2 sprays into each nostril daily (Patient taking differently: 2 sprays into each nostril daily as needed  ), Disp: 1 Bottle, Rfl: 11    hydrOXYzine HCL (ATARAX) 25 mg tablet, Take 1 tablet (25 mg total) by mouth every 6 (six) hours as needed for itching, Disp: 14 tablet, Rfl: 0    naproxen (Naprosyn) 500 mg tablet, Take 1 tablet (500 mg total) by mouth 2 (two) times a day with meals (Patient taking differently: Take 500 mg by mouth 2 (two) times a day with meals As needed ), Disp: 14 tablet, Rfl: 0    pantoprazole (PROTONIX) 40 mg tablet, Take 1 tablet (40 mg total) by mouth daily (Patient taking differently: Take 40 mg by mouth daily As needed ), Disp: 30 tablet, Rfl: 1    sucralfate (CARAFATE) 1 g/10 mL suspension, Take 10 mL (1 g total) by mouth 4 (four) times a day as needed (epigastric discomfort) for up to 7 days, Disp: 420 mL, Rfl: 0  No Known Allergies      Labs:  Appointment on 04/12/2022   Component Date Value    Cholesterol 04/12/2022 196     Triglycerides 04/12/2022 73     HDL, Direct 04/12/2022 54     LDL Calculated 04/12/2022 127*    Hepatitis C Ab 04/12/2022 Non-reactive     HIV-1/HIV-2 Ab 04/12/2022 Non-Reactive     EARL 04/12/2022 Negative     Sed Rate 04/12/2022 7    Appointment on 02/23/2022   Component Date Value    Vitamin B-12 02/23/2022 639     Vit D, 25-Hydroxy 02/23/2022 53 1         Imaging: No results found  Review of Systems:  Review of Systems   Constitutional: Negative for chills, diaphoresis, fatigue, fever and unexpected weight change  HENT: Negative for trouble swallowing and voice change  Eyes: Negative for pain and redness  Respiratory: Negative for shortness of breath and wheezing  Cardiovascular: Negative for chest pain, palpitations and leg swelling  Gastrointestinal: Negative for abdominal pain, constipation, diarrhea, nausea and vomiting  Genitourinary: Negative for dysuria  Musculoskeletal: Negative for neck pain and neck stiffness  Neurological: Negative for dizziness, syncope, light-headedness and headaches  Psychiatric/Behavioral: Negative for agitation and confusion     All other systems reviewed and are negative  Vitals:    04/13/22 1446   BP: 106/78   Pulse: 75   Resp: 16   Temp: 98 1 °F (36 7 °C)   TempSrc: Tympanic   SpO2: 98%   Weight: 80 7 kg (178 lb)   Height: 5' 7" (1 702 m)     Vitals:    04/13/22 1446   Weight: 80 7 kg (178 lb)     Height: 5' 7" (170 2 cm)     Physical Exam:  Physical Exam  Vitals reviewed  Constitutional:       General: He is not in acute distress  Appearance: Normal appearance  He is not diaphoretic  HENT:      Head: Normocephalic and atraumatic  Eyes:      General:         Right eye: No discharge  Left eye: No discharge  Neck:      Comments: Trachea midline, no JVD present  Cardiovascular:      Rate and Rhythm: Normal rate and regular rhythm  Heart sounds: No friction rub  Pulmonary:      Effort: Pulmonary effort is normal  No respiratory distress  Breath sounds: Normal breath sounds  No wheezing  Abdominal:      General: Bowel sounds are normal       Palpations: Abdomen is soft  Tenderness: There is no abdominal tenderness  Musculoskeletal:      Right lower leg: No edema  Left lower leg: No edema  Skin:     General: Skin is warm and dry  Neurological:      Mental Status: He is alert  Comments: Awake, alert, able to answer questions appropriately, able move extremities bilaterally     Psychiatric:         Mood and Affect: Mood normal          Behavior: Behavior normal

## 2022-07-05 ENCOUNTER — OFFICE VISIT (OUTPATIENT)
Dept: NEUROLOGY | Facility: CLINIC | Age: 32
End: 2022-07-05
Payer: COMMERCIAL

## 2022-07-05 VITALS
HEIGHT: 67 IN | BODY MASS INDEX: 27.88 KG/M2 | HEART RATE: 74 BPM | DIASTOLIC BLOOD PRESSURE: 78 MMHG | SYSTOLIC BLOOD PRESSURE: 106 MMHG | WEIGHT: 177.6 LBS

## 2022-07-05 DIAGNOSIS — G44.89 OTHER HEADACHE SYNDROME: Primary | ICD-10-CM

## 2022-07-05 PROCEDURE — 99204 OFFICE O/P NEW MOD 45 MIN: CPT | Performed by: PSYCHIATRY & NEUROLOGY

## 2022-07-05 RX ORDER — CHLORPHENIRAMINE/PHENYLPROPAN 8 MG-75 MG
1000 CAPSULE, EXTENDED RELEASE ORAL DAILY
COMMUNITY

## 2022-07-05 RX ORDER — ASCORBATE CALCIUM 500 MG
500 TABLET ORAL DAILY
COMMUNITY

## 2022-07-05 RX ORDER — FOLIC ACID 0.8 MG
500 TABLET ORAL DAILY
COMMUNITY

## 2022-07-05 NOTE — PROGRESS NOTES
Morena Palmer is a 28 y o  male  Chief Complaint   Patient presents with    Headache       Assessment:  1  Other headache syndrome        Plan:  MRI scan of the brain  Lyme test   Avoid migraine triggers  Follow-up with his other physicians  Follow-up in 3 months  Discussion:  Differential diagnosis discussed with the patient, it is possible that he has been having tension headaches/secondary to TMJ ,doubt  this is from trigeminal neuralgia, would recommend an MRI scan of the brain to make sure the small underlying structural lesion since patient's headaches are different and worse from before, he will call me after the test to discuss the results, he was also advised to keep his blood pressure cholesterol and sugar under control, is not keen to go on a daily prophylactic medication   I have advised him to avoid migraine triggers which we discussed in detail also was advised to avoid excessive use of over-the-counter analgesics, he was advised to follow-up with his other physicians including his dentist regarding his TMJ to go to the hospital if has any worsening symptoms and call me otherwise see me back in 3 months and follow-up with his other physician  Subjective:    HPI   Patient is here for evaluation of headaches, he describes his headaches mostly as a pressure headache left temporal area worse than the right since January he had some dental implants and worked last year and since end of December he has been having the symptoms he has been having headaches at least 3 to 4 times a week with 05/01/2010 could last for an hour to 2 hours not associated with photophobia phonophobia sometimes has to take an occasional Tylenol he saw his dentist and was felt that maybe he has some component of TMJ, sometimes he would have numbness and tingling in his hands after eating he also has a globus sensation in his throat and some throat tightness for which he has in follow-up with a GI specialist ENT and recently had a cardiac workup, no vision difficulties no speech difficulty no focal weakness no family history of any aneurysm, he had mild headaches in the past but these headaches are new and different, no other complaints  Vitals:    07/05/22 1426   BP: 106/78   BP Location: Left arm   Patient Position: Sitting   Cuff Size: Standard   Pulse: 74   Weight: 80 6 kg (177 lb 9 6 oz)   Height: 5' 7" (1 702 m)       Current Medications    Current Outpatient Medications:     amitriptyline (ELAVIL) 10 mg tablet, Take 1 tablet (10 mg total) by mouth daily at bedtime, Disp: 90 tablet, Rfl: 0    Calcium Ascorbate (VITAMIN C) 500 mg tablet, Take 500 mg by mouth daily, Disp: , Rfl:     Cholecalciferol (EQL Vitamin D3) 25 MCG (1000 UT) capsule, Take 1,000 Units by mouth daily, Disp: , Rfl:     fluticasone (FLONASE) 50 mcg/act nasal spray, 2 sprays into each nostril daily (Patient taking differently: 2 sprays into each nostril daily as needed), Disp: 1 Bottle, Rfl: 11    hydrOXYzine HCL (ATARAX) 25 mg tablet, Take 1 tablet (25 mg total) by mouth every 6 (six) hours as needed for itching, Disp: 14 tablet, Rfl: 0    Magnesium 500 MG CAPS, Take 500 mg by mouth in the morning, Disp: , Rfl:     naproxen (Naprosyn) 500 mg tablet, Take 1 tablet (500 mg total) by mouth 2 (two) times a day with meals (Patient taking differently: Take 500 mg by mouth 2 (two) times a day with meals As needed), Disp: 14 tablet, Rfl: 0    pantoprazole (PROTONIX) 40 mg tablet, Take 1 tablet (40 mg total) by mouth daily (Patient taking differently: Take 40 mg by mouth daily As needed), Disp: 30 tablet, Rfl: 1    sucralfate (CARAFATE) 1 g/10 mL suspension, Take 10 mL (1 g total) by mouth 4 (four) times a day as needed (epigastric discomfort) for up to 7 days, Disp: 420 mL, Rfl: 0      Allergies  Patient has no known allergies      Past Medical History  Past Medical History:   Diagnosis Date    Anxiety 12/14/2021    Chest pain     GERD (gastroesophageal reflux disease)          Past Surgical History:  Past Surgical History:   Procedure Laterality Date    DENTAL IMPLANT           Family History:  Family History   Problem Relation Age of Onset    Thyroid disease Mother     No Known Problems Father        Social History:   reports that he has never smoked  He has never used smokeless tobacco  He reports previous alcohol use  He reports that he does not use drugs  I have reviewed the past medical history, surgical history, social and family history, current medications, allergies vitals, review of systems, and updated this information as appropriate today  Objective:    Physical Exam    Neurological Exam    GENERAL:  Cooperative in no acute distress  Well-developed and well-nourished    HEAD and NECK   Head is atraumatic normocephalic with no lesions or masses  Neck is supple with full range of motion    CARDIOVASCULAR  Carotid Arteries-no carotid bruits  NEUROLOGIC:  Mental Status-the patient is awake alert and oriented without aphasia or apraxia  Cranial Nerves: Visual fields are full to confrontation, visual acuity is 20/20 with hand-held chart Extraocular movements are full without nystagmus  Pupils are 2-1/2 mm and reactive  Face is symmetrical to light touch  Movements of facial expression move symmetrically  Hearing is normal to finger rub bilaterally  Soft palate lifts symmetrically  Shoulder shrug is symmetrical  Tongue is midline without atrophy  Motor: No drift is noted on arm extension  Strength is full in the upper and lower extremities with normal bulk and tone  Sensory: Intact to temperature and vibratory sensation in the upper and lower extremities bilaterally  Cortical function is intact  Coordination: Finger to nose testing is performed accurately  Romberg is negative  Gait reveals a normal base with symmetrical arm swing   Tandem walk is normal   Reflexes:  2+ symmetrical  No temporal artery tenderness, no mastoid tenderness          ROS:  Review of Systems   Constitutional: Negative  HENT: Negative  Eyes: Negative  Respiratory: Negative  Cardiovascular: Negative  Gastrointestinal: Negative  Endocrine: Negative  Genitourinary: Negative  Musculoskeletal: Negative  Skin: Negative  Allergic/Immunologic: Negative  Neurological: Positive for headaches  Tingling    Hematological: Negative  Psychiatric/Behavioral: Negative

## 2022-07-18 ENCOUNTER — APPOINTMENT (OUTPATIENT)
Dept: LAB | Facility: CLINIC | Age: 32
End: 2022-07-18
Payer: COMMERCIAL

## 2022-07-18 DIAGNOSIS — G44.89 OTHER HEADACHE SYNDROME: ICD-10-CM

## 2022-07-18 PROCEDURE — 36415 COLL VENOUS BLD VENIPUNCTURE: CPT

## 2022-07-18 PROCEDURE — 86618 LYME DISEASE ANTIBODY: CPT

## 2022-07-19 LAB — B BURGDOR IGG+IGM SER-ACNC: 0.3 AI

## 2022-07-20 ENCOUNTER — HOSPITAL ENCOUNTER (OUTPATIENT)
Dept: MRI IMAGING | Facility: HOSPITAL | Age: 32
Discharge: HOME | End: 2022-07-20
Attending: PSYCHIATRY & NEUROLOGY
Payer: COMMERCIAL

## 2022-07-20 DIAGNOSIS — G44.89 OTHER HEADACHE SYNDROME: ICD-10-CM

## 2022-07-20 PROCEDURE — 70551 MRI BRAIN STEM W/O DYE: CPT

## 2022-07-20 PROCEDURE — G1004 CDSM NDSC: HCPCS

## 2022-08-09 ENCOUNTER — HOSPITAL ENCOUNTER (OUTPATIENT)
Dept: CT IMAGING | Facility: HOSPITAL | Age: 32
Discharge: HOME | End: 2022-08-09
Attending: OTOLARYNGOLOGY
Payer: COMMERCIAL

## 2022-08-09 DIAGNOSIS — J32.2 CHRONIC ETHMOIDAL SINUSITIS: ICD-10-CM

## 2022-08-09 DIAGNOSIS — J33.9 NASAL POLYP: ICD-10-CM

## 2022-08-09 PROCEDURE — 70486 CT MAXILLOFACIAL W/O DYE: CPT

## 2022-08-09 PROCEDURE — G1004 CDSM NDSC: HCPCS

## 2022-09-08 ENCOUNTER — OFFICE VISIT (OUTPATIENT)
Dept: FAMILY MEDICINE CLINIC | Facility: CLINIC | Age: 32
End: 2022-09-08
Payer: COMMERCIAL

## 2022-09-08 VITALS
BODY MASS INDEX: 26.74 KG/M2 | HEIGHT: 67 IN | TEMPERATURE: 96.2 F | DIASTOLIC BLOOD PRESSURE: 80 MMHG | SYSTOLIC BLOOD PRESSURE: 104 MMHG | HEART RATE: 78 BPM | OXYGEN SATURATION: 99 % | WEIGHT: 170.4 LBS

## 2022-09-08 DIAGNOSIS — M54.2 NECK PAIN: Primary | ICD-10-CM

## 2022-09-08 PROCEDURE — 99213 OFFICE O/P EST LOW 20 MIN: CPT | Performed by: FAMILY MEDICINE

## 2022-09-08 NOTE — PROGRESS NOTES
Assessment/Plan:    No problem-specific Assessment & Plan notes found for this encounter  Diagnoses and all orders for this visit:    Neck pain  -     XR spine cervical complete 4 or 5 vw non injury; Future          Continue supportive care  Will get x-ray to rule out any cervical pathology given his bilateral upper extremity radicular symptoms  Subjective:      Patient ID: Brittney Connelly is a 28 y o  male  Presents to the office for neck pain and bilateral arm numbness and tingling  Diagnosed with TMJ by dentist and was recommended to come to PCP to evaluate other further causes  Symptoms have been going on for a few months  They are intermittent and "annoying"  Denies any weakness in his arms  The following portions of the patient's history were reviewed and updated as appropriate: allergies, current medications, past family history, past medical history, past social history, past surgical history and problem list     Review of Systems   Constitutional: Negative for chills and fever  HENT: Negative for ear pain and sore throat  Eyes: Negative for pain and visual disturbance  Respiratory: Negative for cough and shortness of breath  Cardiovascular: Negative for chest pain and palpitations  Gastrointestinal: Negative for abdominal pain and vomiting  Genitourinary: Negative for dysuria and hematuria  Musculoskeletal: Negative for arthralgias and back pain  Skin: Negative for color change and rash  Neurological: Negative for seizures and syncope  All other systems reviewed and are negative  Objective:      /80 (BP Location: Left arm, Patient Position: Sitting, Cuff Size: Standard)   Pulse 78   Temp (!) 96 2 °F (35 7 °C) (Tympanic)   Ht 5' 7" (1 702 m)   Wt 77 3 kg (170 lb 6 4 oz)   SpO2 99%   BMI 26 69 kg/m²          Physical Exam  Vitals and nursing note reviewed  Constitutional:       General: He is not in acute distress       Appearance: Normal appearance  He is not ill-appearing, toxic-appearing or diaphoretic  Eyes:      General:         Right eye: No discharge  Left eye: No discharge  Extraocular Movements: Extraocular movements intact  Conjunctiva/sclera: Conjunctivae normal    Cardiovascular:      Rate and Rhythm: Normal rate  Pulmonary:      Effort: Pulmonary effort is normal    Musculoskeletal:         General: Tenderness (mild cervical paraspinal ) present  Cervical back: Normal range of motion and neck supple  Neurological:      Mental Status: He is alert and oriented to person, place, and time  Psychiatric:         Mood and Affect: Mood normal          Behavior: Behavior normal          Thought Content:  Thought content normal          Judgment: Judgment normal

## 2022-09-09 ENCOUNTER — APPOINTMENT (OUTPATIENT)
Dept: RADIOLOGY | Facility: CLINIC | Age: 32
End: 2022-09-09
Payer: COMMERCIAL

## 2022-09-09 DIAGNOSIS — M54.2 NECK PAIN: ICD-10-CM

## 2022-09-09 PROCEDURE — 72050 X-RAY EXAM NECK SPINE 4/5VWS: CPT

## 2022-09-13 NOTE — H&P (VIEW-ONLY)
Assessment/Plan:  Nasal  endoscopy was performed  At last visit noting inflammation and obstruction of left middle meatus ; CT confirms OMU obstruction on left and partial obstruction on right which explains maxillary and ethmoid sinus disease  Chronic sinusitis: We discussed the nature chronic sinusitis  We discussed maximal medical therapy including starting 21 day course of antibiotics, 12 day steroid taper, and nasal steroids  Maximal medical therapy can substantially improve symptoms, but neither medications or surgery is curative of chronic sinus disease and will require ongoing treatment  We discussed the risks of antibiotics, including, but not limited to, incomplete therapy, C diff colitis, medication her reactions, medication allergy, and others  We discussed the risks benefits and alternatives to oral prednisone therapy, including, but not limited to, GI distress in worsening of ulcers, elevation of blood sugar and diabetic complications, psychiatric complications, and avascular necrosis of hip  We will obtain a CT scan after therapy and have the patient return in 1 month for re-evaluation  Alternative treatment option includes proceeding with surgery  Both options include avoidance behavior regarding positive allergens with daily Fluticasone and antihistamine as well as sinus lavage  Based upon the history given and the current physical findings I have recommended that the patient undergo bilateral  image-guided functional endoscopic sinus surgery (FESS)  All risks, benefits, alternatives and complications of the procedure have been reviewed in detail  A risk sheet was signed with the patient    The risks of the surgery include but are not limited to:  Bleeding, infection, need for further surgery or treatment, recurrence of disease, inability to complete surgery (residual disease), injury to the olfactory nerve (inability to smell, phantom smell), injury to the orbit and muscles around the eye (double vision, decreased vision, blindness, orbital abscess), injury to the intracranial contents (CSF leak, brain injury, meningitis, encephalitis, brain abscess), cheek pain or numbness, scar formation  The patient / parent understands and accepts the risks of the surgical procedure  Pre-operative testing has been ordered  Medical clearance is not required  Post operative medication and instructions have been given and the medication may be filled prior to the procedure  A post operative appointment has been scheduled  If the patient / parent has any questions prior to the date of the procedure they may call the office and I will be glad to discuss any questions or concerns with them  Encounter Diagnosis     ICD-10-CM    1  Environmental allergies  Z91 09    2  Chronic ethmoidal sinusitis  J32 2 predniSONE 20 mg tablet     oxyCODONE-acetaminophen (Percocet) 5-325 mg per tablet   3  Chronic maxillary sinusitis  J32 0 predniSONE 20 mg tablet     oxyCODONE-acetaminophen (Percocet) 5-325 mg per tablet   4  Nasal polyp  J33 9 predniSONE 20 mg tablet     oxyCODONE-acetaminophen (Percocet) 5-325 mg per tablet          Patient ID: Angeles Daley is a 33 y o  male  Loretta Clinton returns to review CT imaging which demonstrates bimaxillary sinus disease as well as L>R ethmoid disease with frontal and sphenoid sinuses well aerated  Allergy testing confirms strong atopic response to dust mites, mold including Mucor, Pullularia, cat dander and cockroach  8/2/2022:  Loretta Clinton was referred to [facility] for reevaluation after MRI of the brain showed paranasal sinus disease  He also was evaluated by his dentist with CT imaging performed and reported as maxillary and ethmoid sinusitis with question of a polyp in "left nasal fossa'  Loretta Clinton has had sinus issues including congestion with associated ear pressure and globus sensation evaluated in January     He was recommended to use nasal lavage as well as topical corticosteroid - he has had some relief with benadryl and Claritin noted in January  He does still have occasional throat tightening as well as chronic postnasal drip described as bitter at times  He has not been treated with antibiotics recently or for recurrent sinus infections over last 12 months  Unfortunately I could not view CT from dentist but was able to personally view MRI       1/25/2022:  Layne Huizar presents for evaluation of sinus  And ear pressure as well as a lump feeling of his throat and associated voice hoarseness and throat burning sensation at times  He had a lot of stress from work this past fall causing chest pain and anxiety that was felt to be stress related and leading to GERD  He is scheduled for upper endoscopy with GI on 2/7/22  He did modify his diet to eat better just recently decreasing sodas and caffeine and eating lighter which has helped some  He takes Pantoprazole daily which has helped some  He does feel that the stress is still an issue and likely aggravating his symptoms  He denies any breathing difficulty  He denies any difficulty eating or drinking  Layne Huizar was here for evaluation of allergies in March 2021 with Dr Sonja Foster  He states that over the past 18 months that there are days when he wakes in the AM with a sneezing fit (sneezes 4 times) and it may be associated with some short lived rhinorrhea  The sneezing fits recur throughout the AM until about 12PM and then resolves  He is not sure if this is from environmental allergies  He has tried to treat this in the AM with a Benadryl or Claritin - he thinks that the Benadryl is helpful  He may have taken this the night before but he is not sure if this really helped because he did not do this as much  He has not noticed any seasonal variation to symptoms  He currently lives in a private home  The home is clean with a carpet in the bedroom  No pets    He has baseboard heating and he has window unit air conditioners  The mattress was purchased in 2017 prior to the onset of symptoms  Pillows are regularly washed and fairly new  He has another apartment in [place] - he sometimes notices that he has similar issues when he is there    In preparation for this visit I reviewed the Epic charting system and no significant information was found  Sinus Problem  Associated symptoms include congestion and headaches  Pertinent negatives include no coughing, ear pain, neck pain, shortness of breath, sinus pressure, sneezing or sore throat         The following portions of the patient's history were reviewed and updated as appropriate: allergies, current medications, past family history, past medical history, past social history, past surgical history and problem list       Past Medical History:   Diagnosis Date    Anxiety 12/14/2021    Chest pain     GERD (gastroesophageal reflux disease)        Past Surgical History:   Procedure Laterality Date    DENTAL IMPLANT         Social History     Tobacco Use    Smoking status: Never Smoker    Smokeless tobacco: Never Used   Vaping Use    Vaping Use: Never used   Substance Use Topics    Alcohol use: Not Currently    Drug use: Never       Current Outpatient Medications on File Prior to Visit   Medication Sig Dispense Refill    Calcium Ascorbate (VITAMIN C) 500 mg tablet Take 500 mg by mouth daily      Cholecalciferol (EQL Vitamin D3) 25 MCG (1000 UT) capsule Take 1,000 Units by mouth daily      fluticasone (FLONASE) 50 mcg/act nasal spray 2 sprays into each nostril daily 15 8 mL 11    Magnesium 500 MG CAPS Take 500 mg by mouth in the morning      amitriptyline (ELAVIL) 10 mg tablet Take 1 tablet (10 mg total) by mouth daily at bedtime (Patient not taking: No sig reported) 90 tablet 0    fluticasone (FLONASE) 50 mcg/act nasal spray 2 sprays into each nostril daily (Patient taking differently: 2 sprays into each nostril daily as needed) 1 Bottle 11    hydrOXYzine HCL (ATARAX) 25 mg tablet Take 1 tablet (25 mg total) by mouth every 6 (six) hours as needed for itching (Patient not taking: No sig reported) 14 tablet 0    naproxen (Naprosyn) 500 mg tablet Take 1 tablet (500 mg total) by mouth 2 (two) times a day with meals (Patient not taking: No sig reported) 14 tablet 0    pantoprazole (PROTONIX) 40 mg tablet Take 1 tablet (40 mg total) by mouth daily (Patient not taking: No sig reported) 30 tablet 1    sucralfate (CARAFATE) 1 g/10 mL suspension Take 10 mL (1 g total) by mouth 4 (four) times a day as needed (epigastric discomfort) for up to 7 days 420 mL 0     No current facility-administered medications on file prior to visit  No Known Allergies        Review of Systems   Constitutional: Negative for activity change, appetite change, fatigue and unexpected weight change  HENT: Positive for congestion and rhinorrhea  Negative for ear discharge, ear pain, facial swelling, hearing loss, nosebleeds, postnasal drip, sinus pressure, sinus pain, sneezing, sore throat, tinnitus, trouble swallowing and voice change  Sneezing fits   Eyes: Negative for photophobia, pain, discharge, itching and visual disturbance  Respiratory: Negative for cough, chest tightness and shortness of breath  Musculoskeletal: Negative for gait problem, neck pain and neck stiffness  Skin: Negative for rash and wound  Allergic/Immunologic: Negative for environmental allergies  Neurological: Positive for headaches  Negative for dizziness, facial asymmetry and speech difficulty  Hematological: Negative for adenopathy  Psychiatric/Behavioral: Negative for sleep disturbance  The patient is not nervous/anxious  /78   Pulse 61   Ht 5' 7" (1 702 m)   Wt 77 1 kg (170 lb)   BMI 26 63 kg/m²       PHYSICAL  EXAMINATION    CONSTITUTION:    Appears appropriate for age  No evidence of any acute distress  Communicates normally  Voice quality is clear  Alert and oriented  HEAD/FACE:    Atraumatic, normocephalic on inspection  No scars present  Salivary glands are normal in texture and size without any asymmetry  Facial nerve function is symmetric and normal     EYES:    Extraocular muscles intact in both eyes, normal gaze bilaterally and no evidence of nystagmus  Pupils equal, round, and accommodate to light bilaterally  EARS:    External ears normal     External canals are clear and dry  Tympanic membranes intact with normal mobility, no effusion, no retraction, no perforation  Post auricular area is normal    NOSE:    External nose without deformity  Internal mucosa pink and moist     Septum midline  Inferior nasal turbinates normal in color and size bilaterally    ORAL CAVITY:    Lips normal and healthy in appearance  Dentition normal     Gums healthy, pink and moist     Tongue appears pink and moist with no lesions  Floor of mouth pink, moist, and smooth  Submandibular ducts patent with clear saliva  Parotid ducts patent with clear saliva  Oral mucosa pink and moist     Hard palate normal in appearance without any lesions  OROPHARYNX:    Soft palate pink and moist without any lesions  Uvula midline without any lesions  Tonsils grade 2+ bilaterally  Posterior pharynx pink and moist without any lesions - some cobblestoning    NECK:    Supple and symmetric  No masses noted  Trachea midline  No thyromegaly or nodules noted  LYMPH:    No palpable adenopathy in left or right neck    SKIN:    No rashes  No lesions noted      Lungs:  Clear to auscultation bilaterally; no rales, rhonchi or wheezing in all lung fields    CV:  Regular rate and rhythm    ABDOMEN:   Soft, nontender, BS X4

## 2022-09-19 ENCOUNTER — ANESTHESIA EVENT (OUTPATIENT)
Dept: PERIOP | Facility: HOSPITAL | Age: 32
End: 2022-09-19
Payer: COMMERCIAL

## 2022-09-19 ENCOUNTER — LAB (OUTPATIENT)
Dept: LAB | Facility: CLINIC | Age: 32
End: 2022-09-19
Payer: COMMERCIAL

## 2022-09-19 DIAGNOSIS — D68.9 COAGULOPATHY (HCC): ICD-10-CM

## 2022-09-19 DIAGNOSIS — J32.0 CHRONIC MAXILLARY SINUSITIS: ICD-10-CM

## 2022-09-19 DIAGNOSIS — J32.2 CHRONIC ETHMOIDAL SINUSITIS: ICD-10-CM

## 2022-09-19 LAB
APTT PPP: 29 SECONDS (ref 23–37)
BASOPHILS # BLD AUTO: 0.04 THOUSANDS/ΜL (ref 0–0.1)
BASOPHILS NFR BLD AUTO: 1 % (ref 0–1)
EOSINOPHIL # BLD AUTO: 0.16 THOUSAND/ΜL (ref 0–0.61)
EOSINOPHIL NFR BLD AUTO: 3 % (ref 0–6)
ERYTHROCYTE [DISTWIDTH] IN BLOOD BY AUTOMATED COUNT: 13.3 % (ref 11.6–15.1)
HCT VFR BLD AUTO: 43.1 % (ref 36.5–49.3)
HGB BLD-MCNC: 14.1 G/DL (ref 12–17)
IMM GRANULOCYTES # BLD AUTO: 0.01 THOUSAND/UL (ref 0–0.2)
IMM GRANULOCYTES NFR BLD AUTO: 0 % (ref 0–2)
INR PPP: 0.98 (ref 0.84–1.19)
LYMPHOCYTES # BLD AUTO: 1.56 THOUSANDS/ΜL (ref 0.6–4.47)
LYMPHOCYTES NFR BLD AUTO: 33 % (ref 14–44)
MCH RBC QN AUTO: 28.6 PG (ref 26.8–34.3)
MCHC RBC AUTO-ENTMCNC: 32.7 G/DL (ref 31.4–37.4)
MCV RBC AUTO: 87 FL (ref 82–98)
MONOCYTES # BLD AUTO: 0.54 THOUSAND/ΜL (ref 0.17–1.22)
MONOCYTES NFR BLD AUTO: 11 % (ref 4–12)
NEUTROPHILS # BLD AUTO: 2.43 THOUSANDS/ΜL (ref 1.85–7.62)
NEUTS SEG NFR BLD AUTO: 52 % (ref 43–75)
NRBC BLD AUTO-RTO: 0 /100 WBCS
PLATELET # BLD AUTO: 247 THOUSANDS/UL (ref 149–390)
PMV BLD AUTO: 10.5 FL (ref 8.9–12.7)
PROTHROMBIN TIME: 13.2 SECONDS (ref 11.6–14.5)
RBC # BLD AUTO: 4.93 MILLION/UL (ref 3.88–5.62)
WBC # BLD AUTO: 4.74 THOUSAND/UL (ref 4.31–10.16)

## 2022-09-19 PROCEDURE — 85610 PROTHROMBIN TIME: CPT

## 2022-09-19 PROCEDURE — 36415 COLL VENOUS BLD VENIPUNCTURE: CPT

## 2022-09-19 PROCEDURE — 85025 COMPLETE CBC W/AUTO DIFF WBC: CPT

## 2022-09-19 PROCEDURE — 85730 THROMBOPLASTIN TIME PARTIAL: CPT

## 2022-09-23 NOTE — PRE-PROCEDURE INSTRUCTIONS
Pre-Surgery Instructions:   Medication Instructions    Calcium Ascorbate (VITAMIN C) 500 mg tablet Stop 9/23  Do not take morning of surgery    Cholecalciferol (EQL Vitamin D3) 25 MCG (1000 UT) capsule Stop 9/23  Do not take morning of surgery    fluticasone (FLONASE) 50 mcg/act nasal spray Take day of surgery   Magnesium 500 MG CAPS Stop 9/23  Do not take morning of surgery    predniSONE 20 mg tablet Stop 9/23  Do not take morning of surgery   Covid screening negative as per patient  Reviewed [facility]'s masking policy  Patient verbalized understanding  Reviewed showering and medication instructions  Take medications morning of surgery with a small sip of water  Patient verbalized understanding  Advised NPO after MN and ASC will call with scheduled surgical time  Instructed to stop NSAIDS and non prescribed vitamins  Tylenol is OK to take

## 2022-09-28 ENCOUNTER — ANESTHESIA (OUTPATIENT)
Dept: PERIOP | Facility: HOSPITAL | Age: 32
End: 2022-09-28
Payer: COMMERCIAL

## 2022-09-28 ENCOUNTER — HOSPITAL ENCOUNTER (OUTPATIENT)
Facility: HOSPITAL | Age: 32
Setting detail: OUTPATIENT SURGERY
Discharge: HOME | End: 2022-09-28
Attending: OTOLARYNGOLOGY | Admitting: OTOLARYNGOLOGY
Payer: COMMERCIAL

## 2022-09-28 VITALS
BODY MASS INDEX: 27.06 KG/M2 | OXYGEN SATURATION: 98 % | HEIGHT: 67 IN | RESPIRATION RATE: 12 BRPM | WEIGHT: 172.4 LBS | SYSTOLIC BLOOD PRESSURE: 121 MMHG | TEMPERATURE: 98.2 F | DIASTOLIC BLOOD PRESSURE: 65 MMHG | HEART RATE: 76 BPM

## 2022-09-28 DIAGNOSIS — J32.0 CHRONIC MAXILLARY SINUSITIS: ICD-10-CM

## 2022-09-28 DIAGNOSIS — J32.2 CHRONIC ETHMOIDAL SINUSITIS: ICD-10-CM

## 2022-09-28 PROCEDURE — 31253 NSL/SINS NDSC TOTAL: CPT | Performed by: OTOLARYNGOLOGY

## 2022-09-28 PROCEDURE — C2625 STENT, NON-COR, TEM W/DEL SY: HCPCS | Performed by: OTOLARYNGOLOGY

## 2022-09-28 PROCEDURE — 31267 ENDOSCOPY MAXILLARY SINUS: CPT | Performed by: OTOLARYNGOLOGY

## 2022-09-28 PROCEDURE — 88305 TISSUE EXAM BY PATHOLOGIST: CPT | Performed by: STUDENT IN AN ORGANIZED HEALTH CARE EDUCATION/TRAINING PROGRAM

## 2022-09-28 PROCEDURE — 61782 SCAN PROC CRANIAL EXTRA: CPT | Performed by: OTOLARYNGOLOGY

## 2022-09-28 DEVICE — PROPEL SINUS IMPLANT
Type: IMPLANTABLE DEVICE | Site: NOSE | Status: FUNCTIONAL
Brand: PROPEL

## 2022-09-28 RX ORDER — MIDAZOLAM HYDROCHLORIDE 2 MG/2ML
INJECTION, SOLUTION INTRAMUSCULAR; INTRAVENOUS AS NEEDED
Status: DISCONTINUED | OUTPATIENT
Start: 2022-09-28 | End: 2022-09-28

## 2022-09-28 RX ORDER — ACETAMINOPHEN 500 MG
1000 TABLET ORAL EVERY 6 HOURS PRN
COMMUNITY

## 2022-09-28 RX ORDER — PROPOFOL 10 MG/ML
INJECTION, EMULSION INTRAVENOUS CONTINUOUS PRN
Status: DISCONTINUED | OUTPATIENT
Start: 2022-09-28 | End: 2022-09-28

## 2022-09-28 RX ORDER — FENTANYL CITRATE/PF 50 MCG/ML
25 SYRINGE (ML) INJECTION
Status: DISCONTINUED | OUTPATIENT
Start: 2022-09-28 | End: 2022-09-28 | Stop reason: HOSPADM

## 2022-09-28 RX ORDER — DEXAMETHASONE SODIUM PHOSPHATE 10 MG/ML
INJECTION, SOLUTION INTRAMUSCULAR; INTRAVENOUS AS NEEDED
Status: DISCONTINUED | OUTPATIENT
Start: 2022-09-28 | End: 2022-09-28

## 2022-09-28 RX ORDER — DEXTROSE MONOHYDRATE AND SODIUM CHLORIDE 5; .45 G/100ML; G/100ML
125 INJECTION, SOLUTION INTRAVENOUS CONTINUOUS
Status: DISCONTINUED | OUTPATIENT
Start: 2022-09-28 | End: 2022-09-28 | Stop reason: HOSPADM

## 2022-09-28 RX ORDER — LIDOCAINE HYDROCHLORIDE 10 MG/ML
INJECTION, SOLUTION EPIDURAL; INFILTRATION; INTRACAUDAL; PERINEURAL AS NEEDED
Status: DISCONTINUED | OUTPATIENT
Start: 2022-09-28 | End: 2022-09-28

## 2022-09-28 RX ORDER — OXYCODONE HYDROCHLORIDE AND ACETAMINOPHEN 5; 325 MG/1; MG/1
2 TABLET ORAL EVERY 4 HOURS PRN
Status: DISCONTINUED | OUTPATIENT
Start: 2022-09-28 | End: 2022-09-28 | Stop reason: HOSPADM

## 2022-09-28 RX ORDER — ONDANSETRON 2 MG/ML
INJECTION INTRAMUSCULAR; INTRAVENOUS AS NEEDED
Status: DISCONTINUED | OUTPATIENT
Start: 2022-09-28 | End: 2022-09-28

## 2022-09-28 RX ORDER — ONDANSETRON 2 MG/ML
4 INJECTION INTRAMUSCULAR; INTRAVENOUS ONCE AS NEEDED
Status: DISCONTINUED | OUTPATIENT
Start: 2022-09-28 | End: 2022-09-28 | Stop reason: HOSPADM

## 2022-09-28 RX ORDER — FENTANYL CITRATE 50 UG/ML
INJECTION, SOLUTION INTRAMUSCULAR; INTRAVENOUS AS NEEDED
Status: DISCONTINUED | OUTPATIENT
Start: 2022-09-28 | End: 2022-09-28

## 2022-09-28 RX ORDER — ONDANSETRON 2 MG/ML
4 INJECTION INTRAMUSCULAR; INTRAVENOUS EVERY 6 HOURS PRN
Status: DISCONTINUED | OUTPATIENT
Start: 2022-09-28 | End: 2022-09-28 | Stop reason: HOSPADM

## 2022-09-28 RX ORDER — GLYCOPYRROLATE 0.2 MG/ML
INJECTION INTRAMUSCULAR; INTRAVENOUS AS NEEDED
Status: DISCONTINUED | OUTPATIENT
Start: 2022-09-28 | End: 2022-09-28

## 2022-09-28 RX ORDER — PROPOFOL 10 MG/ML
INJECTION, EMULSION INTRAVENOUS AS NEEDED
Status: DISCONTINUED | OUTPATIENT
Start: 2022-09-28 | End: 2022-09-28

## 2022-09-28 RX ORDER — ROCURONIUM BROMIDE 10 MG/ML
INJECTION, SOLUTION INTRAVENOUS AS NEEDED
Status: DISCONTINUED | OUTPATIENT
Start: 2022-09-28 | End: 2022-09-28

## 2022-09-28 RX ORDER — ACETAMINOPHEN 325 MG/1
650 TABLET ORAL EVERY 4 HOURS PRN
Status: DISCONTINUED | OUTPATIENT
Start: 2022-09-28 | End: 2022-09-28 | Stop reason: HOSPADM

## 2022-09-28 RX ORDER — LIDOCAINE HYDROCHLORIDE AND EPINEPHRINE 10; 10 MG/ML; UG/ML
INJECTION, SOLUTION INFILTRATION; PERINEURAL AS NEEDED
Status: DISCONTINUED | OUTPATIENT
Start: 2022-09-28 | End: 2022-09-28 | Stop reason: HOSPADM

## 2022-09-28 RX ORDER — COCAINE HYDROCHLORIDE 40 MG/ML
SOLUTION NASAL AS NEEDED
Status: DISCONTINUED | OUTPATIENT
Start: 2022-09-28 | End: 2022-09-28 | Stop reason: HOSPADM

## 2022-09-28 RX ORDER — MAGNESIUM HYDROXIDE 1200 MG/15ML
LIQUID ORAL AS NEEDED
Status: DISCONTINUED | OUTPATIENT
Start: 2022-09-28 | End: 2022-09-28 | Stop reason: HOSPADM

## 2022-09-28 RX ORDER — SODIUM CHLORIDE 9 MG/ML
125 INJECTION, SOLUTION INTRAVENOUS CONTINUOUS
Status: DISCONTINUED | OUTPATIENT
Start: 2022-09-28 | End: 2022-09-28 | Stop reason: HOSPADM

## 2022-09-28 RX ADMIN — SODIUM CHLORIDE: 0.9 INJECTION, SOLUTION INTRAVENOUS at 09:35

## 2022-09-28 RX ADMIN — MIDAZOLAM 2 MG: 1 INJECTION INTRAMUSCULAR; INTRAVENOUS at 08:38

## 2022-09-28 RX ADMIN — ACETAMINOPHEN 650 MG: 325 TABLET ORAL at 12:24

## 2022-09-28 RX ADMIN — GLYCOPYRROLATE 0.2 MG: 0.2 INJECTION, SOLUTION INTRAMUSCULAR; INTRAVENOUS at 09:14

## 2022-09-28 RX ADMIN — SODIUM CHLORIDE 125 ML/HR: 0.9 INJECTION, SOLUTION INTRAVENOUS at 07:08

## 2022-09-28 RX ADMIN — DEXAMETHASONE SODIUM PHOSPHATE 10 MG: 10 INJECTION, SOLUTION INTRAMUSCULAR; INTRAVENOUS at 08:45

## 2022-09-28 RX ADMIN — LIDOCAINE HYDROCHLORIDE 100 MG: 10 INJECTION, SOLUTION EPIDURAL; INFILTRATION; INTRACAUDAL; PERINEURAL at 08:45

## 2022-09-28 RX ADMIN — FENTANYL CITRATE 50 MCG: 50 INJECTION INTRAMUSCULAR; INTRAVENOUS at 10:18

## 2022-09-28 RX ADMIN — PROPOFOL 200 MG: 10 INJECTION, EMULSION INTRAVENOUS at 08:45

## 2022-09-28 RX ADMIN — SODIUM CHLORIDE 0.1 MCG/KG/MIN: 9 INJECTION, SOLUTION INTRAVENOUS at 08:45

## 2022-09-28 RX ADMIN — ONDANSETRON 4 MG: 2 INJECTION INTRAMUSCULAR; INTRAVENOUS at 08:45

## 2022-09-28 RX ADMIN — FENTANYL CITRATE 50 MCG: 50 INJECTION INTRAMUSCULAR; INTRAVENOUS at 08:45

## 2022-09-28 RX ADMIN — PROPOFOL 100 MCG/KG/MIN: 10 INJECTION, EMULSION INTRAVENOUS at 08:45

## 2022-09-28 RX ADMIN — ROCURONIUM BROMIDE 40 MG: 50 INJECTION, SOLUTION INTRAVENOUS at 08:45

## 2022-09-28 NOTE — ANESTHESIA PREPROCEDURE EVALUATION
Procedure:  FESS IMAGED GUIDED (Bilateral Nose)    Relevant Problems   CARDIO   (+) Chest pain   (+) Mobitz type 1 second degree AV block      GI/HEPATIC   (+) Gastroesophageal reflux disease without esophagitis      NEURO/PSYCH   (+) Anxiety   (+) Other headache syndrome   (+) Tension headache      Other   (+) Lightheaded   (+) Palpitations        Physical Exam    Airway    Mallampati score: II  TM Distance: >3 FB  Neck ROM: full     Dental   No notable dental hx     Cardiovascular  Rhythm: regular, Cardiovascular exam normal    Pulmonary  Pulmonary exam normal Breath sounds clear to auscultation,     Other Findings        Anesthesia Plan  ASA Score- 2     Anesthesia Type- general with ASA Monitors  Additional Monitors:   Airway Plan: ETT  Comment: History    Palpitations, chest pain, hyperlipidemia, GERD, non smoker      Interpretation Summary         Left Ventricle: Left ventricular cavity size is normal  The left ventricular ejection fraction is 60%  Systolic function is normal  Wall motion is normal  Diastolic function is normal     Right Ventricle: Right ventricular cavity size is mildly dilated  Systolic function is low normal     Pulmonic Valve: There is mild regurgitation    Pulmonary Artery: The main pulmonary artery is mildly dilated  Stress normal, also  [place] Plan Factors-    Chart reviewed  Existing labs reviewed  Patient summary reviewed  Patient is not a current smoker  Patient not instructed to abstain from smoking on day of procedure  Patient did not smoke on day of surgery  There is medical exclusion for perioperative obstructive sleep apnea risk education  Induction- intravenous  Postoperative Plan-     Informed Consent- Anesthetic plan and risks discussed with patient and mother (Joshua Koroma)

## 2022-09-28 NOTE — INTERVAL H&P NOTE
H&P reviewed  After examining the patient I find no changes in the patients condition since the H&P had been written      Vitals:    09/28/22 0645   BP: 109/57   Pulse: 66   Resp: 14   Temp: 98 9 °F (37 2 °C)   SpO2: 99%

## 2022-09-28 NOTE — OP NOTE
OPERATIVE REPORT  PATIENT NAME: Sharee Andre    :  1990  MRN: [id number]  Pt Location: [facility] OR ROOM 02    SURGERY DATE: 2022    Surgeon(s) and Role:     * Florina Petit DO - Primary    Preop Diagnosis:  Chronic ethmoidal sinusitis [J32 2]  Chronic maxillary sinusitis [J32 0]    Post-Op Diagnosis Codes:     * Chronic ethmoidal sinusitis [J32 2]     * Chronic maxillary sinusitis [J32 0]  Chronic frontal sinusitis  Nasal polyposis    Procedure(s) (LRB):  FESS IMAGED GUIDED (Bilateral) Bilateral maxillary sinusotomies, bilateral total ethmoidectomies w/ removal of polyps, bilateral frontal exploration with removal of tissue and polyps, bilateral propel placement    Specimen(s):  * No specimens in log *    Estimated Blood Loss:   Minimal    Drains:  * No LDAs found *    Anesthesia Type:   General    Operative Indications:  Chronic ethmoidal sinusitis [J32 2]  Chronic maxillary sinusitis [J32 0]      Operative Findings:  CRS involving maxillary ,ethmoid and L>R frontal sinuses    Complications:   None    Procedure and Technique:  After the patient was properly identified in the holding area brought to the operating room and placed on the operating table in the usual supine position  Informed time-out was completed by the operative surgeon confirmed by Nursing and anesthesia with no contraindications to surgery  The image guided system was tested and noted to be functional prior to starting the case  General endotracheal anesthesia was initiated when deemed adequate the patient was rotated 90° to his left  The image guided head gear was attached to the patient and calibrated to the machine noted to be functional   Patient was prepped and draped in the usual sterile fashion  Cotton pledgets soaked in 4% cocaine solution were placed in the left and right nasal vault  After 5 minutes the cotton pledgets were removed    And the middle turbinates and lateral nasal walls were injected with 1% lidocaine [id number] epinephrine for a total of 8 mL  Cotton pledgets replaced in the right nasal vault and using 0 degree Escobedo miguel endoscopy the left nasal vault was identified and the middle turbinate was visualized  The middle turbinate was medialized  The uncinate process was also medialized using a seeker probe  An uncinectomy was performed using backbiting forceps as well as the micro debrider under endoscopic vision  The antrostomy was then enlarged with the same instruments  Using the micro debrider the ethmoid bulla was infractured at the anterior inferior medial position  All anterior ethmoid cells were resected using the micro debrider as well as through cut straight and up-biting Blakelee forceps  The basal lamella was identified and infractured  We worked posteriorly to till we identified the skull base then used the skull base as well as the lamina papyracea as our lateral margin and carefully worked from inferior to superior along the skull base and then laterally along the lamina debriding all posterior ethmoid cells working posterior to anterior  We then switched to a 30 degree endoscope  The suprabullar ethmoid cells were cleared with multiple polyps removed debriding the frontal recess until the frontal ostia was visualized  The frontal ostia was widened - then widely patent  Propel implant was placed in the left middle meatus and ethmoid vault  We then switched to the right side removing the cotton pledgets  Using 0 degree Escobedo miguel endoscopy the right maxillary sinus and uncinate process were visualized  The right middle turbinate was paradoxical and obstructing the middle meatus  There turbinate was medialized and noted to be flaccid  The uncinate was medialized and using backbiting forceps and the micro debrider large antrostomy was created on the right    The ethmoid bulla was infractured and the anterior ethmoid cells were resected in the same fashion working anterior inferiorly posteriorly till we identified the basal lamella  The basal lamella was infractured and I worked posteriorly identifying again the skull base and laterally the lamina per pre sure  I then worked from posterior to anterior and debrided all posterior ethmoid cells  Using a 30 degree endoscope the suprabullar cells were carefully debrided clearing the frontal recess; multiple polyps were removed which were obstructing the recess  and identifying the frontal ostia noting it was widely patent  The flaccid portion of right middle turbinate was resected  Propel implant was placed in right ethmoid vault and middle meatus  The nasopharynx was suctioned  General endotracheal anesthesia was discontinued  The patient was awakened and returned to the RR in stable condition       I was present for the entire procedure    Patient Disposition:  PACU         SIGNATURE: [unfilled]  DATE: September 28, 2022  TIME: 8:36 AM

## 2022-09-28 NOTE — ANESTHESIA POSTPROCEDURE EVALUATION
Post-Op Assessment Note    CV Status:  Stable    Pain management: adequate     Mental Status:  Alert and awake   Hydration Status:  Euvolemic   PONV Controlled:  Controlled   Airway Patency:  Patent      Post Op Vitals Reviewed: Yes      Staff: Anesthesiologist         No complications documented      BP      Temp      Pulse     Resp      SpO2      /58   Pulse 67   Temp 99 6 °F (37 6 °C) (Temporal)   Resp 12   Ht 5' 7" (1 702 m)   Wt 78 2 kg (172 lb 6 4 oz)   SpO2 97%   BMI 27 00 kg/m²

## 2022-09-28 NOTE — DISCHARGE INSTRUCTIONS
[facility]    POST OPERATIVE SINUS SURGERY INSTRUCTIONS      1  Change drip pad under the nose as needed for bleeding  2  Keep head of bed elevated  Sleep on at least a few pillows at night  3  Expect and do not become concerned about not being able to breathe through your nose  You will be a mouth-breather for approximately one week  4  You may cleanse crusting from the anterior portion of your nose with hydrogen peroxide and Q-tips  You may use Ocean nasal spray throughout the day to prevent crusting inside nasal cavity and splints  5  Begin using sinus rinse two times daily  If you have nasal splints in place you may start after the nasal splints are removed  6  Do not blow your nose until exactly 1 week after surgery  7  If you must sneeze, sneeze with mouth open  8  Avoid any strenuous activity, exercise, bending, or lifting, until approved by your surgeon  9  If there is significant bleeding, you may use over-the-counter Afrin  Place 2 sprays into the bleeding nostril every 5 minutes up to 3 consecutive times  If bleeding does not stop, call our office at [phone] or [phone] or go directly to the emergency room  10  If you have severe pain which is uncontrolled by medication, significant bleeding or a fever greater than 101°F, notify our office immediately at [phone] or [phone]  11   If you have a prescription for pain medication follow appropriate directions on label  If no prescription was given you may take over the counter pain medication as needed  12   If you have a prescription for steroids (Prednisone, Prednisolone) you may begin taking this medication the day following the surgical procedure  13   If you were instruction on using medicated rinses you may start those rinses the day following the surgical procedure  14   No smoking  Avoid second hand smoke exposure

## 2022-10-06 PROCEDURE — 88305 TISSUE EXAM BY PATHOLOGIST: CPT | Performed by: STUDENT IN AN ORGANIZED HEALTH CARE EDUCATION/TRAINING PROGRAM

## 2022-10-11 ENCOUNTER — OFFICE VISIT (OUTPATIENT)
Dept: PHYSICAL THERAPY | Facility: CLINIC | Age: 32
End: 2022-10-11
Payer: COMMERCIAL

## 2022-10-11 DIAGNOSIS — R29.898 DECREASED RANGE OF MOTION OF NECK: ICD-10-CM

## 2022-10-11 DIAGNOSIS — R29.898 TIGHTNESS OF NECK: ICD-10-CM

## 2022-10-11 PROCEDURE — 97161 PT EVAL LOW COMPLEX 20 MIN: CPT | Performed by: PHYSICAL MEDICINE & REHABILITATION

## 2022-10-11 PROCEDURE — 97110 THERAPEUTIC EXERCISES: CPT | Performed by: PHYSICAL MEDICINE & REHABILITATION

## 2022-10-11 PROCEDURE — 97530 THERAPEUTIC ACTIVITIES: CPT | Performed by: PHYSICAL MEDICINE & REHABILITATION

## 2022-10-11 NOTE — PROGRESS NOTES
PT Evaluation     Today's date: 10/11/2022  Patient name: Diamante Finley  : 1990  MRN: [id number]  Referring provider: Vickie Ramos, *  Dx:   Encounter Diagnosis     ICD-10-CM    1  Tightness of neck  R29 898    2  Decreased range of motion of neck  R29 898        Start Time: 910  Stop Time: 1000  Total time in clinic (min): 50 minutes    Assessment  Assessment details: Diamante Finley is a 33 y o  male presenting to outpatient physical therapy for direct access evaluation with c/c of "tightness"/pulling above the R/L collar bone/ anterior/lower c-spine  Began earlier this year with insidious onset  Does note he was having GI testing done for GERD around that time  Sx are presently mostly all day  Notes also intermittent n/t in L UE to L hand; seems to be worse after weight lifting or running; resolves with rest  WNL neuro screening noted today with symmetrical UE strength, light touch sensation, and UE reflexes B; - Condon's B  He demonstrates grossly WNL c-spine AROM; lateral flexion to R and L did reproduce concordant tightness of R/L scalene/SCM region  He also noted pec mm tightness L > R with self doorway stretching  No radicular sx or n/t produced with eval today  He was educated in self stretching for R/L scalenes/SCM, UT, and pectoral mms  He was also educated in seated postural awareness at the computer and avoidance of prolonged periods of sitting  PT also reviewed red flags such as chest pain, SOB, syncope/dizziness, severe HA, vision change, chest pressure etc and need for urgent medical attention/fu; understanding noted/reported  No red flags noted at this time  Sx appear to be musculoskeletal in nature with possible underlying components of L thoracic outlet syndrome; will cont to monitor  Pt would like to trial his HEP for a few weeks at home and monitor sx for improvement; will f/u with PT if any new/additional sx present or if any additional questions/concerns arise   No questions/concerns noted at end of session; no questions/concerns with HEP at this time  Pt to perform Hep and f/u with PT in a few weeks  Impairments: abnormal muscle tone, abnormal or restricted ROM, abnormal movement, lacks appropriate home exercise program and poor posture     Symptom irritability: moderateUnderstanding of Dx/Px/POC: good   Prognosis: good  Prognosis details: Positive prognostic indicators: positive attitude towards recovery, low-moderate sx irritability, no red flag sx at this time  Negative prognostic indicators: anxiety        Goals  STGs to be achieved in 4-6 weeks:    1  Pt will report reduced neck pain/tightness levels "at worst" by at least 2 points (0-10 scale) in order to allow improved tolerance for functional mobility and reduced reliance on medication or modalities for pain relief  2  Pt will demonstrate improved AROM of R/L c-spine lateral flexion by at least 5-10* in order to allow patient to safely turn his head while driving or crossing the street  3  Pt to report no n/t in L UE with/after lifting weights or running at least 50% of the time or better  LTGs to be achieved in 8-12 weeks:    1  Pt will be independent with HEP, demonstrating proper technique with exercises and understanding of self progression of program without need for cueing or assistance  4  Pt will demonstrate understanding of proper posture and impact of poor posture/sustained poor postures on patient pain/sx  5  Pt will report return to working out/lifting without limitations  6  FOTO will reflect score at discharge that is greater than or equal to predicted level             Plan  Plan details: Pt to trial HEP x 2-3 weeks and notify PT of progress/sx or if any new sx/concerns arise   Patient would benefit from: skilled physical therapy  Planned modality interventions: cryotherapy and thermotherapy: hydrocollator packs  Planned therapy interventions: manual therapy, motor coordination training, neuromuscular re-education, patient education, postural training, self care, strengthening, stretching, therapeutic activities, therapeutic exercise, flexibility and home exercise program  Frequency: 2x week  Duration in visits: 8  Duration in weeks: 12  Plan of Care beginning date: 10/11/2022  Plan of Care expiration date: 11/22/2022  Treatment plan discussed with: patient        Subjective Evaluation    History of Present Illness  Mechanism of injury: Started noticing sx in March 2022  Intermittent sensations of "tightness" anterior lower neck/upper chest and L pec region  Sx come/go  Lasts majority of the day; present within an hour of waking;  doesn't allow him to fully relax  Doesn't seem to have any triggers  No relieving positions, stretches, activities, etc  Notes intermittent "slight jab" of pain L central  pec region  These intermittent sx have been present since about May 2022  No known trauma/SEE or change in activity  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  No other change in workouts, work, activity etc  Intermittent n/t into L arm; seems to occur after lifting and /or running  Does not wake up out of sleep with sx  No other known triggers  Intermittent n/ L calf; seems to occur sitting in office chair; better once up /moving around  Occasional L sided temporal "soreness"; can radiate into L ear; no associated neck pain, dizziness, vision change, etc  Did see his dentist and neurology in June due to this; notes exam and imaging "looked good/normal" according to both of them; possible "ossified sternohyoid ligaments" B per dentist per pt however may be an "incidental finding"  Pt notes he has mentioned these sx to his PCP; xray of spine done which was negative  Did see cardiology in March /April; noted no "red flags" were found; no medical management needed per pt; does see him again next year  F/u with ENT today; had a procedure done recently; goes for f/u    Overall his biggest concern is the "tighntess" in his lower neck /above collar bones B  Recurrent probem    Quality of life: good    Pain  Current pain ratin  At best pain ratin  At worst pain rating: 3  Location: L pectoral region at times   Quality: dull ache, sharp and tight  Progression: no change    Social Support    Employment status: working (management, works from home)    Diagnostic Tests  X-ray: normal  Treatments  Current treatment: physical therapy  Patient Goals  Patient goals for therapy: decreased pain and increased motion  Patient's goals regarding treatment: resolve the tightness  Objective     Concurrent Complaints  Positive for dizziness (occasionally in the mornings since taking medications post ENT procedure) and headaches (ocasional L sided temporal region)   Negative for night pain, disturbed sleep, faints, nausea/motion sickness, tinnitus, trouble swallowing, difficulty breathing, shortness of breath, respiratory pain, visual change and history of trauma    Additional Special Questions  Recent ENT surgery ; see chart     Postural Observations  Seated posture: fair  Standing posture: fair  Correction of posture: has no consistent effect    Additional Postural Observation Details  Forward head/rounded shoulders  Increased thoracic kyphosis   B scapular depression and protraction with mild winging   Increased PPT in sitting        Tenderness     Additional Tenderness Details  Minimal ttp L SCJ region vs R  No other ttp L/R shoulder, L/R pec, L/R clavicle , L/R SCM/scalenes, L/R UT, c-spine/PVMs or SPs, etc   Notes "tightness" in region of scalenes/scm and their attachments to L/R clavicle  Will cont to assess       Neurological Testing     Sensation   Cervical/Thoracic   Left   Intact: light touch    Right   Intact: light touch    Reflexes   Left   Biceps (C5/C6): normal (2+)  Brachioradialis (C6): normal (2+)  Triceps (C7): normal (2+)  Condon's reflex: negative    Right   Biceps (C5/C6): normal (2+)  Brachioradialis (C6): normal (2+)  Triceps (C7): normal (2+)  Condon's reflex: negative    Active Range of Motion   Cervical/Thoracic Spine       Cervical    Flexion: Neck active flexion: "feels normal"  WFL  Extension: Neck active extension: "feels normal"     WFL  Left lateral flexion: 40 ("tight" in region of SCM/scalenes at end range ) degrees      Right lateral flexion: 38 ("tight" in region of SCM/scalenes at end range) degrees      Left rotation:  [facility]  Right rotation:  [facility]    Additional Active Range of Motion Details  Local tightness only at end ranges of R/L lateral flexion c-spine   No other pain or concordant signs/sx with c-spine AROM or with gentle overpressure applied at end ranges  Noted concordant tightness above R/L collar bone with end range R/L scalene stretch > UT stretch    Tightness noted L > R pec with doorway B pec stretch, especially at 90*      R/L Shoulder flexion/abduction WNL without pain/sx     Strength/Myotome Testing   Cervical Spine     Left   Interossei strength (t1): 4+    Right   Interossei strength (t1): 4+    Left Shoulder     Planes of Motion   Flexion: 5   Abduction: 5   External rotation at 0°: 5   Internal rotation at 0°: 5     Right Shoulder     Planes of Motion   Flexion: 5   Abduction: 5   External rotation at 0°: 5   Internal rotation at 0°: 5     Left Elbow   Flexion: 5  Extension: 5    Right Elbow   Flexion: 5  Extension: 5    Left Wrist/Hand   Wrist extension: 4+  Wrist flexion: 4+  Thumb extension: 4+    Right Wrist/Hand   Wrist extension: 4+  Wrist flexion: 4+  Thumb extension: 4+    Additional Strength Details  No pain/sx with MMT screen  Strength good and symmetrical t/o B UEs  No concordant signs/sx with testing        Tests   Cervical   Negative Lhermitte's sign  Left   Negative Spurling's Test A  Right   Negative Spurling's Test A  Thoracic   Negative slump test and chest expansion test      Left Shoulder   Negative ULTT1  Right Shoulder   Negative ULTT1  Additional Tests Details  Notes "tightness" L arm/forearm with end range L ULTT median testing; no n/t or sensory changes; no change in the "tightness" with neck lateral flexion to L or R  Will cont to monitor     Neuro Exam:     Headaches   Patient reports headaches: Yes (ocasional L sided temporal region)  Precautions: anxiety, hx GERD, ? Hx AV block       Re-eval Date: 11/22    Date 10/11/2022        Visit Count 1       FOTO 10/11/2022        Pain In See IE       Pain Out See IE           Manuals 10/11/2022                                        Neuro Re-Ed                                                                Ther Ex        UT stretch R/L       SCM/scalene stretch R/L  4x30"         4x30"       Pec stretch doorway 3 way  4x30"        Supine pec stretch/thoracic mobilization  over roll  Reviewed HEP                                                Ther Activity 10'   pt education regarding pathophysiology/pathoanatomy of present pain/sx and condition, role of PT in improving pain/sx and function, pt education regarding activity modification to avoid exacerbation of sx and delayed recovery, reviewed red flag signs/sx and need for referral to MD, answered all pt questions/concerns                            Gait Training                        Modalities                              10/11/2022  - HEP was issued and reviewed this date for above noted exercises  Pt demonstrated understanding without incident and without questions/concerns  Will continue to update upcoming

## 2022-11-10 ENCOUNTER — TELEPHONE (OUTPATIENT)
Dept: NEUROLOGY | Facility: CLINIC | Age: 32
End: 2022-11-10

## 2022-11-10 ENCOUNTER — OFFICE VISIT (OUTPATIENT)
Dept: NEUROLOGY | Facility: CLINIC | Age: 32
End: 2022-11-10

## 2022-11-10 VITALS
HEART RATE: 57 BPM | HEIGHT: 67 IN | SYSTOLIC BLOOD PRESSURE: 120 MMHG | DIASTOLIC BLOOD PRESSURE: 72 MMHG | OXYGEN SATURATION: 99 % | TEMPERATURE: 96.9 F | BODY MASS INDEX: 27.25 KG/M2

## 2022-11-10 DIAGNOSIS — G44.89 OTHER HEADACHE SYNDROME: Primary | ICD-10-CM

## 2022-11-10 RX ORDER — GABAPENTIN 100 MG/1
100 CAPSULE ORAL
Qty: 30 CAPSULE | Refills: 5 | Status: SHIPPED | OUTPATIENT
Start: 2022-11-10

## 2022-11-10 NOTE — PROGRESS NOTES
Nina Hairston is a 33 y o  male  Chief Complaint   Patient presents with   • : Other headache syndrome       Assessment:  1  Other headache syndrome        Plan:  Neurontin 100 mg at nighttime if agreeable with his other physicians  Avoid migraine triggers  Follow-up in 3-4 months    Discussion:  Patient's MRI scan of the brain results were reviewed with him, it was essentially unremarkable except for pansinusitis mucosal thickening he followed up with an ENT surgeon and had a CT scan of the sinuses and recently had sinus surgery he tells me that his symptoms are slightly better but still continues to have soreness on the left temporal area with some numbness and sometimes it involves his left upper and lower eyelid, his blood work was unremarkable I discussed with him different medication options he is agreeable to go on gabapentin I discussed with him the side effects of the medication he will discuss with his other physicians and if agreeable will take the medication and if he has any side effects to stop and let me know I have advised him to see if there are any triggers for his symptoms to avoid migraine triggers which we discussed in detail including foods to avoid he was advised to keep himself well hydrated advised to avoid excessive use of over-the-counter analgesics he did follow-up with his dentist and to he was told that he does not have TMJ to go to the hospital if has any worsening symptoms and call me otherwise to see me back in 3-4 months and follow up with his other physicians      Subjective:    HPI   Patient is here in follow-up for the headaches that he describes mostly in the left temporal area since January he had some dental implants and dental work done last year, he describes mostly as soreness about 6 on 10 it could last for a few hours it is not associated with photophobia phonophobia and he does feel that some numbness sometimes it radiates into his left upper eyelid and the left lower eyelid his dentist told him that he does not have any TMJ he recently had sinus surgery and tells me that he feels his symptoms are little better currently does not have any throat tightness he did follow-up with the GI specialist and was told he does not have any reflux disease, 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 are that 2 to 3 times a week but he does not feel that the computers are triggering it appetite is good weight has been stable no other complaints  Vitals:    11/10/22 1340   BP: 120/72   BP Location: Right arm   Patient Position: Sitting   Cuff Size: Adult   Pulse: 57   Temp: (!) 96 9 °F (36 1 °C)   TempSrc: Temporal   SpO2: 99%   Height: 5' 7" (1 702 m)       Current Medications    Current Outpatient Medications:   •  acetaminophen (TYLENOL) 500 mg tablet, Take 1,000 mg by mouth every 6 (six) hours as needed for mild pain, Disp: , Rfl:   •  Calcium Ascorbate (VITAMIN C) 500 mg tablet, Take 500 mg by mouth daily, Disp: , Rfl:   •  Cholecalciferol (EQL Vitamin D3) 25 MCG (1000 UT) capsule, Take 1,000 Units by mouth daily, Disp: , Rfl:   •  Magnesium 500 MG CAPS, Take 500 mg by mouth in the morning, Disp: , Rfl:   •  predniSONE 20 mg tablet, 3 tablets by mouth with food in AM x 3 days, 2 tabs by mouth with food in AM x 3 days then 1 tab with food each morning x 3 days (Patient not taking: No sig reported), Disp: 18 tablet, Rfl: 0      Allergies  Patient has no known allergies  Past Medical History  Past Medical History:   Diagnosis Date   • Anxiety 12/14/2021   • Chest pain    • GERD (gastroesophageal reflux disease)     Resolved 9/23*22         Past Surgical History:  Past Surgical History:   Procedure Laterality Date   • DENTAL IMPLANT     • EGD     • NASAL/SINUS ENDOSCOPY Bilateral 9/28/2022    Procedure: FESS IMAGED GUIDED, BL maxillary sinusotomies, BL total Ethmoidectomies remove polyp, BL frontal exploration remove tissue and polyp; Surgeon:  Mynor Gibson DO; Location: [facility] OR;  Service: ENT         Family History:  Family History   Problem Relation Age of Onset   • Thyroid disease Mother    • No Known Problems Father        Social History:   reports that he has never smoked  He has never used smokeless tobacco  He reports current alcohol use  He reports that he does not use drugs  I have reviewed the past medical history, surgical history, social and family history, current medications, allergies vitals, review of systems, and updated this information as appropriate today  Objective:    Physical Exam    Neurological Exam    GENERAL:  Cooperative in no acute distress  Well-developed and well-nourished    HEAD and NECK   Head is atraumatic normocephalic with no lesions or masses  Neck is supple with full range of motion    CARDIOVASCULAR  Carotid Arteries-no carotid bruits  NEUROLOGIC:  Mental Status-the patient is awake alert and oriented without aphasia or apraxia  Cranial Nerves: Visual fields are full to confrontation  Visual acuity is 20/20 with hand-held chart Extraocular movements are full without nystagmus  Pupils are 2-1/2 mm and reactive  Face is symmetrical to light touch  Movements of facial expression move symmetrically  Hearing is normal to finger rub bilaterally  Soft palate lifts symmetrically  Shoulder shrug is symmetrical  Tongue is midline without atrophy  Motor: No drift is noted on arm extension  Strength is full in the upper and lower extremities with normal bulk and tone  Gait is unremarkable  No temporal artery tenderness, no mastoid tenderness, no cervical spine tenderness  ROS:  Review of Systems   Constitutional: Negative  Negative for appetite change and fever  HENT: Negative  Negative for hearing loss, tinnitus, trouble swallowing and voice change  Eyes: Negative  Negative for photophobia, pain and visual disturbance  Respiratory: Negative  Negative for shortness of breath  Cardiovascular: Negative  Negative for palpitations  Gastrointestinal: Negative  Negative for nausea and vomiting  Endocrine: Negative  Negative for cold intolerance  Genitourinary: Negative  Negative for dysuria, frequency and urgency  Musculoskeletal: Negative  Negative for gait problem, myalgias and neck pain  Skin: Negative  Negative for rash  Allergic/Immunologic: Negative  Neurological: Positive for numbness and headaches  Negative for dizziness, tremors, seizures, syncope, facial asymmetry, speech difficulty, weakness and light-headedness  Patient statues numbness in left  Hematological: Negative  Does not bruise/bleed easily  Psychiatric/Behavioral: Negative  Negative for confusion, hallucinations and sleep disturbance

## 2022-11-10 NOTE — TELEPHONE ENCOUNTER
Patient called to let us know he is running about 5 minutes late for his 1:30pm appointment with Dr Shivani Wood  Advised as long as he arrives before 1:45pm that is okay and that I will let the  know  Called the  and spoke with Jan Saez, advised that patient will be about 5 minutes late

## 2022-11-23 ENCOUNTER — TELEPHONE (OUTPATIENT)
Dept: NEUROLOGY | Facility: CLINIC | Age: 32
End: 2022-11-23

## 2022-11-23 NOTE — TELEPHONE ENCOUNTER
Received EOB in the mail regarding patient's 11/10/22 OVS w/ Dr Reardon Cancel  Mail has been scanned into this encounter and sent to the billing dept

## 2023-01-09 ENCOUNTER — OFFICE VISIT (OUTPATIENT)
Dept: OBGYN CLINIC | Facility: CLINIC | Age: 33
End: 2023-01-09

## 2023-01-09 VITALS
HEIGHT: 67 IN | WEIGHT: 174 LBS | DIASTOLIC BLOOD PRESSURE: 75 MMHG | BODY MASS INDEX: 27.31 KG/M2 | HEART RATE: 61 BPM | SYSTOLIC BLOOD PRESSURE: 111 MMHG

## 2023-01-09 DIAGNOSIS — G89.29 NECK PAIN, CHRONIC: Primary | ICD-10-CM

## 2023-01-09 DIAGNOSIS — M54.2 NECK PAIN, CHRONIC: Primary | ICD-10-CM

## 2023-01-09 NOTE — PROGRESS NOTES
[facility] [place] [address]  [facility] [id number] [phone]      Chief Complaint:  Chief Complaint   Patient presents with   • Neck - Pain   • Left Shoulder - Pain       Vitals:  /75 (BP Location: Right arm, Patient Position: Sitting, Cuff Size: Standard)   Pulse 61   Ht 5' 7" (1 702 m)   Wt 78 9 kg (174 lb)   BMI 27 25 kg/m²     The following portions of the patient's history were reviewed and updated as appropriate: allergies, current medications, past family history, past medical history, past social history, past surgical history, and problem list       Subjective:   Patient ID: Woody Atkins is a 33 y o  male  Here c/o temple/ear pain  Started in 3/22  Soreness on daily basis  Hx of sinus surgery- improved a little  Neck pain when sleeping on stomach with head turned L- feels like a tightness  Mild soreness/tightness in L sided neck  Radiates down to neck  Sometimes soreness in L arm  Sometimes take ibuprofen- no help      CERVICAL SPINE     INDICATION:   M54 2: Cervicalgia      COMPARISON:  None     VIEWS:  XR SPINE CERVICAL COMPLETE 4 OR 5 VW NON INJURY         FINDINGS:     No fracture       Normal alignment without subluxation      The intervertebral disc spaces are preserved       The neuroforamina are patent      The prevertebral soft tissues are within normal limits        The lung apices are clear      IMPRESSION:     Normal radiographic appearance of the cervical spine          Review of Systems   Constitutional: Negative for fatigue and fever  Respiratory: Negative for shortness of breath  Cardiovascular: Negative for chest pain  Gastrointestinal: Negative for abdominal pain and nausea  Genitourinary: Negative for dysuria  Musculoskeletal: Positive for arthralgias and neck pain  Skin: Negative for rash and wound  Neurological: Negative for weakness and headaches         Objective:  Back Exam Tenderness   The patient is experiencing tenderness in the cervical     Range of Motion   The patient has normal back ROM  Comments:  Pain with neck lat flex left  Neg spurling test            Physical Exam  Constitutional:       Appearance: Normal appearance  He is normal weight  Eyes:      Extraocular Movements: Extraocular movements intact  Pulmonary:      Effort: Pulmonary effort is normal    Musculoskeletal:      Cervical back: Normal range of motion  Skin:     General: Skin is warm and dry  Neurological:      General: No focal deficit present  Mental Status: He is alert and oriented to person, place, and time  Mental status is at baseline  Psychiatric:         Mood and Affect: Mood normal          Behavior: Behavior normal          Thought Content: Thought content normal          Judgment: Judgment normal          I have personally reviewed pertinent films in PACS and my interpretation is XR C spiine-  mild loss of lordosis         Assessment/Plan:  Assessment/Plan   Diagnoses and all orders for this visit:    Neck pain, chronic  -     Ambulatory Referral to Physical Therapy; Future        Return in about 6 weeks (around 2/20/2023) for Recheck       Valentina Dyson MD

## 2023-02-09 ENCOUNTER — OFFICE VISIT (OUTPATIENT)
Dept: FAMILY MEDICINE CLINIC | Facility: CLINIC | Age: 33
End: 2023-02-09

## 2023-02-09 VITALS
SYSTOLIC BLOOD PRESSURE: 120 MMHG | DIASTOLIC BLOOD PRESSURE: 80 MMHG | TEMPERATURE: 97.6 F | WEIGHT: 182.2 LBS | OXYGEN SATURATION: 98 % | HEART RATE: 69 BPM | RESPIRATION RATE: 18 BRPM | HEIGHT: 67 IN | BODY MASS INDEX: 28.6 KG/M2

## 2023-02-09 DIAGNOSIS — Z00.00 ANNUAL PHYSICAL EXAM: ICD-10-CM

## 2023-02-09 DIAGNOSIS — E78.2 MIXED HYPERLIPIDEMIA: ICD-10-CM

## 2023-02-09 DIAGNOSIS — R59.1 LYMPHADENOPATHY: ICD-10-CM

## 2023-02-09 DIAGNOSIS — Q25.79 CONGENITAL DILATION OF PULMONARY ARTERY: ICD-10-CM

## 2023-02-09 DIAGNOSIS — D68.9 COAGULOPATHY (HCC): ICD-10-CM

## 2023-02-09 DIAGNOSIS — H69.82 ACUTE DYSFUNCTION OF LEFT EUSTACHIAN TUBE: Primary | ICD-10-CM

## 2023-02-09 PROBLEM — H69.92 ACUTE DYSFUNCTION OF LEFT EUSTACHIAN TUBE: Status: ACTIVE | Noted: 2023-02-09

## 2023-02-09 NOTE — PROGRESS NOTES
Assessment/Plan:       Problem List Items Addressed This Visit        Cardiovascular and Mediastinum    Congenital dilation of pulmonary artery     Stable no change in treatment plan            Nervous and Auditory    Acute dysfunction of left eustachian tube - Primary     Pt will resume Flonase now  Nerve pattern follows temporal region into his left periorbital region  He may benefit from massage therapy done properly to relieve the symptoms  I offered short course of prednisone as a possible way of reducing inflammation and opening up the eustachian tube at the same time he would like to forego this and monitor for now  He will start back on Flonase         Relevant Orders    CBC and differential    Comprehensive metabolic panel    Lipid panel    TSH, 3rd generation with Free T4 reflex       Immune and Lymphatic    Lymphadenopathy     Anterior cervical chain lymphadenopathy worse on left  Repeat laboratory work asymptomatic nontender         Relevant Orders    CBC and differential    Comprehensive metabolic panel    Lipid panel    TSH, 3rd generation with Free T4 reflex       Hematopoietic and Hemostatic    Coagulopathy ([address] )     Repeat lab work            Other    Mixed hyperlipidemia     Follow heart healthy diet avoid saturated fats and had lab work at this time         Relevant Orders    CBC and differential    Comprehensive metabolic panel    Lipid panel    TSH, 3rd generation with Free T4 reflex   Other Visit Diagnoses     Annual physical exam        Relevant Orders    CBC and differential    Comprehensive metabolic panel    Lipid panel    TSH, 3rd generation with Free T4 reflex            Subjective:      Patient ID: Juvencio Wayne is a 33 y o  male      Patient presents for left-sided facial numbness and tingling at times radiating from his left eye across the temporal region behind his ear      The following portions of the patient's history were reviewed and updated as appropriate: allergies, current medications, past family history, past medical history, past social history, past surgical history and problem list     Review of Systems   Constitutional: Negative for chills, fatigue and fever  HENT: Negative for congestion, nosebleeds, rhinorrhea, sinus pressure and sore throat  Eyes: Negative for discharge and redness  Respiratory: Negative for cough and shortness of breath  Cardiovascular: Negative for chest pain, palpitations and leg swelling  Gastrointestinal: Negative for abdominal pain, blood in stool and nausea  Endocrine: Negative for cold intolerance, heat intolerance and polyuria  Genitourinary: Negative for dysuria and frequency  Musculoskeletal: Negative for arthralgias, back pain and myalgias  Skin: Negative for rash  Neurological: Negative for dizziness, weakness and headaches  Hematological: Negative for adenopathy  Psychiatric/Behavioral: Negative for behavioral problems and sleep disturbance  The patient is not nervous/anxious  Objective:      /80 (BP Location: Left arm, Patient Position: Sitting, Cuff Size: Standard)   Pulse 69   Temp 97 6 °F (36 4 °C) (Tympanic)   Resp 18   Ht 5' 7" (1 702 m)   Wt 82 6 kg (182 lb 3 2 oz)   SpO2 98%   BMI 28 54 kg/m²        Physical Exam  Vitals and nursing note reviewed  Constitutional:       Appearance: Normal appearance  He is well-developed and normal weight  HENT:      Head: Normocephalic and atraumatic  Right Ear: Tympanic membrane, ear canal and external ear normal       Left Ear: Tympanic membrane, ear canal and external ear normal       Nose: Nose normal       Mouth/Throat:      Mouth: Mucous membranes are moist       Pharynx: Oropharynx is clear  Eyes:      General: No scleral icterus  Extraocular Movements: Extraocular movements intact  Conjunctiva/sclera: Conjunctivae normal       Pupils: Pupils are equal, round, and reactive to light  Neck:      Thyroid: No thyromegaly  Vascular: No JVD  Cardiovascular:      Rate and Rhythm: Normal rate and regular rhythm  Pulses: Normal pulses  Heart sounds: Normal heart sounds  No murmur heard  Pulmonary:      Effort: Pulmonary effort is normal       Breath sounds: Normal breath sounds  No wheezing or rales  Chest:      Chest wall: No tenderness  Abdominal:      General: Bowel sounds are normal  There is no distension  Palpations: Abdomen is soft  There is no mass  Tenderness: There is no abdominal tenderness  There is no guarding or rebound  Musculoskeletal:         General: No tenderness or deformity  Normal range of motion  Cervical back: Normal range of motion and neck supple  Lymphadenopathy:      Cervical: No cervical adenopathy  Skin:     General: Skin is warm and dry  Capillary Refill: Capillary refill takes less than 2 seconds  Findings: No erythema or rash  Neurological:      General: No focal deficit present  Mental Status: He is alert and oriented to person, place, and time  Cranial Nerves: No cranial nerve deficit  Deep Tendon Reflexes: Reflexes are normal and symmetric  Reflexes normal    Psychiatric:         Mood and Affect: Mood normal          Behavior: Behavior normal          Thought Content: Thought content normal          Judgment: Judgment normal           Data:    Laboratory Results: I have personally reviewed the pertinent laboratory results/reports   Radiology/Other Diagnostic Testing Results: I have personally reviewed pertinent reports         Lab Results   Component Value Date    WBC 4 74 09/19/2022    HGB 14 1 09/19/2022    HCT 43 1 09/19/2022    MCV 87 09/19/2022     09/19/2022     Lab Results   Component Value Date     08/20/2014    K 3 5 11/28/2021     11/28/2021    CO2 23 11/28/2021    ANIONGAP 7 08/20/2014    BUN 15 11/28/2021    CREATININE 1 07 11/28/2021    GLUCOSE 84 08/20/2014    CALCIUM 9 8 11/28/2021    AST 28 11/28/2021 ALT 36 11/28/2021    ALKPHOS 60 11/28/2021    PROT 7 5 10/31/2014    BILITOT 1 3 (H) 10/31/2014    EGFR 93 11/28/2021     Lab Results   Component Value Date    CHOLESTEROL 196 04/12/2022    CHOLESTEROL 228 (H) 11/28/2021    CHOLESTEROL 225 (H) 12/30/2016     Lab Results   Component Value Date    HDL 54 04/12/2022    HDL 50 11/28/2021    HDL 56 12/30/2016     Lab Results   Component Value Date    LDLCALC 127 (H) 04/12/2022    LDLCALC 139 (H) 11/28/2021    LDLCALC 155 (H) 12/30/2016     Lab Results   Component Value Date    TRIG 73 04/12/2022    TRIG 193 (H) 11/28/2021    TRIG 70 12/30/2016     No results found for: [place], [place]  Lab Results   Component Value Date    GNQ5SJCNNPGU 1 763 11/28/2021     No results found for: HGBA1C  No results found for: TEMO March, DO

## 2023-02-09 NOTE — ASSESSMENT & PLAN NOTE
Anterior cervical chain lymphadenopathy worse on left    Repeat laboratory work asymptomatic nontender

## 2023-02-09 NOTE — ASSESSMENT & PLAN NOTE
Pt will resume Flonase now  Nerve pattern follows temporal region into his left periorbital region  He may benefit from massage therapy done properly to relieve the symptoms  I offered short course of prednisone as a possible way of reducing inflammation and opening up the eustachian tube at the same time he would like to forego this and monitor for now    He will start back on Flonase

## 2023-02-09 NOTE — PROGRESS NOTES
BMI Counseling: Body mass index is 28 54 kg/m²   The BMI is normal patient continues to exercise and continue with same diet

## 2023-02-15 ENCOUNTER — APPOINTMENT (OUTPATIENT)
Dept: LAB | Facility: CLINIC | Age: 33
End: 2023-02-15

## 2023-02-15 DIAGNOSIS — R59.1 LYMPHADENOPATHY: ICD-10-CM

## 2023-02-15 DIAGNOSIS — Z00.00 ANNUAL PHYSICAL EXAM: ICD-10-CM

## 2023-02-15 DIAGNOSIS — E78.2 MIXED HYPERLIPIDEMIA: ICD-10-CM

## 2023-02-15 DIAGNOSIS — H69.82 ACUTE DYSFUNCTION OF LEFT EUSTACHIAN TUBE: ICD-10-CM

## 2023-02-15 LAB
ALBUMIN SERPL BCP-MCNC: 4.2 G/DL (ref 3.5–5)
ALP SERPL-CCNC: 59 U/L (ref 46–116)
ALT SERPL W P-5'-P-CCNC: 41 U/L (ref 12–78)
ANION GAP SERPL CALCULATED.3IONS-SCNC: 5 MMOL/L (ref 4–13)
AST SERPL W P-5'-P-CCNC: 40 U/L (ref 5–45)
BASOPHILS # BLD AUTO: 0.05 THOUSANDS/ÂΜL (ref 0–0.1)
BASOPHILS NFR BLD AUTO: 1 % (ref 0–1)
BILIRUB SERPL-MCNC: 1.49 MG/DL (ref 0.2–1)
BUN SERPL-MCNC: 14 MG/DL (ref 5–25)
CALCIUM SERPL-MCNC: 9.4 MG/DL (ref 8.3–10.1)
CHLORIDE SERPL-SCNC: 106 MMOL/L (ref 96–108)
CHOLEST SERPL-MCNC: 192 MG/DL
CO2 SERPL-SCNC: 26 MMOL/L (ref 21–32)
CREAT SERPL-MCNC: 0.98 MG/DL (ref 0.6–1.3)
EOSINOPHIL # BLD AUTO: 0.16 THOUSAND/ÂΜL (ref 0–0.61)
EOSINOPHIL NFR BLD AUTO: 3 % (ref 0–6)
ERYTHROCYTE [DISTWIDTH] IN BLOOD BY AUTOMATED COUNT: 13.5 % (ref 11.6–15.1)
GFR SERPL CREATININE-BSD FRML MDRD: 102 ML/MIN/1.73SQ M
GLUCOSE P FAST SERPL-MCNC: 91 MG/DL (ref 65–99)
HCT VFR BLD AUTO: 43.6 % (ref 36.5–49.3)
HDLC SERPL-MCNC: 59 MG/DL
HGB BLD-MCNC: 13.9 G/DL (ref 12–17)
IMM GRANULOCYTES # BLD AUTO: 0.02 THOUSAND/UL (ref 0–0.2)
IMM GRANULOCYTES NFR BLD AUTO: 0 % (ref 0–2)
LDLC SERPL CALC-MCNC: 115 MG/DL (ref 0–100)
LYMPHOCYTES # BLD AUTO: 1.82 THOUSANDS/ÂΜL (ref 0.6–4.47)
LYMPHOCYTES NFR BLD AUTO: 39 % (ref 14–44)
MCH RBC QN AUTO: 27.7 PG (ref 26.8–34.3)
MCHC RBC AUTO-ENTMCNC: 31.9 G/DL (ref 31.4–37.4)
MCV RBC AUTO: 87 FL (ref 82–98)
MONOCYTES # BLD AUTO: 0.52 THOUSAND/ÂΜL (ref 0.17–1.22)
MONOCYTES NFR BLD AUTO: 11 % (ref 4–12)
NEUTROPHILS # BLD AUTO: 2.16 THOUSANDS/ÂΜL (ref 1.85–7.62)
NEUTS SEG NFR BLD AUTO: 46 % (ref 43–75)
NONHDLC SERPL-MCNC: 133 MG/DL
NRBC BLD AUTO-RTO: 0 /100 WBCS
PLATELET # BLD AUTO: 248 THOUSANDS/UL (ref 149–390)
PMV BLD AUTO: 10.5 FL (ref 8.9–12.7)
POTASSIUM SERPL-SCNC: 4.2 MMOL/L (ref 3.5–5.3)
PROT SERPL-MCNC: 7.4 G/DL (ref 6.4–8.4)
RBC # BLD AUTO: 5.02 MILLION/UL (ref 3.88–5.62)
SODIUM SERPL-SCNC: 137 MMOL/L (ref 135–147)
TRIGL SERPL-MCNC: 90 MG/DL
TSH SERPL DL<=0.05 MIU/L-ACNC: 1.42 UIU/ML (ref 0.45–4.5)
WBC # BLD AUTO: 4.73 THOUSAND/UL (ref 4.31–10.16)

## 2023-03-29 ENCOUNTER — OFFICE VISIT (OUTPATIENT)
Dept: NEUROLOGY | Facility: CLINIC | Age: 33
End: 2023-03-29

## 2023-03-29 VITALS
WEIGHT: 180 LBS | HEART RATE: 64 BPM | BODY MASS INDEX: 27.28 KG/M2 | DIASTOLIC BLOOD PRESSURE: 80 MMHG | SYSTOLIC BLOOD PRESSURE: 112 MMHG | HEIGHT: 68 IN | OXYGEN SATURATION: 99 %

## 2023-03-29 DIAGNOSIS — G44.89 OTHER HEADACHE SYNDROME: Primary | ICD-10-CM

## 2023-03-29 DIAGNOSIS — M54.2 CERVICAL MUSCLE PAIN: ICD-10-CM

## 2023-03-29 NOTE — PROGRESS NOTES
"Aiyana Oro is a 33 y o  male  Chief Complaint   Patient presents with   • Headache       Assessment:  1  Other headache syndrome    2  Cervical muscle pain        Plan:  Refer to physical therapy  Follow-up in 6 months    Discussion:  Patient's headaches are at least 70% better, he is in follow-up with an ENT specialist regarding his sinus issues, he has slight cervical discomfort which is most likely muscular in etiology on and off we discussed different options he is not agreeable to go on any medications and advised him to go for physical therapy if does not get any better then we will do an MRI of the C-spine, he was advised to avoid migraine triggers which we discussed in detail, to keep his blood pressure cholesterol and sugar under control, to maintain good posture to go to the hospital if has any worsening symptoms and call me otherwise to see me back in 6 months and follow-up with the other physicians  Subjective:    HPI   Patient is here in follow-up for headaches, since his last visit he tells me that his headaches are much better he still has a mild uncomfortable sensation at times, denies having any jaw pain, he does have mild discomfort in the neck, but no radicular symptoms, no history of trauma, no vision difficulties   no other complaints    Vitals:    03/29/23 1418   BP: 112/80   BP Location: Right arm   Patient Position: Sitting   Cuff Size: Large   Pulse: 64   SpO2: 99%   Weight: 81 6 kg (180 lb)   Height: 5' 8\" (1 727 m)       Current Medications    Current Outpatient Medications:   •  acetaminophen (TYLENOL) 500 mg tablet, Take 1,000 mg by mouth every 6 (six) hours as needed for mild pain, Disp: , Rfl:   •  Calcium Ascorbate (VITAMIN C) 500 mg tablet, Take 500 mg by mouth daily, Disp: , Rfl:   •  Cholecalciferol (EQL Vitamin D3) 25 MCG (1000 UT) capsule, Take 1,000 Units by mouth daily, Disp: , Rfl:   •  Magnesium 500 MG CAPS, Take 500 mg by mouth in the morning, Disp: , Rfl:   •  " gabapentin (Neurontin) 100 mg capsule, Take 1 capsule (100 mg total) by mouth daily at bedtime (Patient not taking: Reported on 1/9/2023), Disp: 30 capsule, Rfl: 5  •  predniSONE 20 mg tablet, 3 tablets by mouth with food in AM x 3 days, 2 tabs by mouth with food in AM x 3 days then 1 tab with food each morning x 3 days (Patient not taking: Reported on 1/9/2023), Disp: 18 tablet, Rfl: 0      Allergies  Patient has no known allergies  Past Medical History  Past Medical History:   Diagnosis Date   • Anxiety 12/14/2021   • Chest pain    • GERD (gastroesophageal reflux disease)     Resolved 9/23*22         Past Surgical History:  Past Surgical History:   Procedure Laterality Date   • DENTAL IMPLANT     • EGD     • NASAL/SINUS ENDOSCOPY Bilateral 9/28/2022    Procedure: FESS IMAGED GUIDED, BL maxillary sinusotomies, BL total Ethmoidectomies remove polyp, BL frontal exploration remove tissue and polyp; Surgeon: Sammy Goodman DO;  Location: [facility];  Service: ENT         Family History:  Family History   Problem Relation Age of Onset   • Thyroid disease Mother    • No Known Problems Father        Social History:   reports that he has never smoked  He has never used smokeless tobacco  He reports current alcohol use  He reports that he does not use drugs  I have reviewed the past medical history, surgical history, social and family history, current medications, allergies vitals, review of systems, and updated this information as appropriate today  Objective:    Physical Exam    Neurological Exam     GENERAL:  Cooperative in no acute distress  Well-developed and well-nourished     HEAD and NECK   Head is atraumatic normocephalic with no lesions or masses  Neck is supple with full range of motion     CARDIOVASCULAR  Carotid Arteries-no carotid bruits  NEUROLOGIC:  Mental Status-the patient is awake alert and oriented without aphasia or apraxia  Cranial Nerves: Visual fields are full to confrontation  Visual acuity is 20/20 with hand-held chart extraocular movements are full without nystagmus  Pupils are 2-1/2 mm and reactive  Face is symmetrical to light touch  Movements of facial expression move symmetrically  Hearing is normal to finger rub bilaterally  Soft palate lifts symmetrically  Shoulder shrug is symmetrical  Tongue is midline without atrophy  Motor: No drift is noted on arm extension  Strength is full in the upper and lower extremities with normal bulk and tone  Sensory: Intact to temperature and vibratory sensation in the upper and lower extremities bilaterally  Cortical function is intact  Coordination: Finger to nose testing is performed accurately  Romberg is negative  Gait reveals a normal base with symmetrical arm swing  Tandem walk is normal   Reflexes: 2+ and symmetrical   No temporal artery tenderness, very mild paraspinal muscle tenderness in the cervical area  ROS:  Review of Systems   Constitutional: Negative  Negative for appetite change and fever  HENT: Negative  Negative for hearing loss, tinnitus, trouble swallowing and voice change  Eyes: Negative  Negative for photophobia, pain and visual disturbance  Respiratory: Negative  Negative for shortness of breath  Cardiovascular: Negative  Negative for palpitations  Gastrointestinal: Negative  Negative for nausea and vomiting  Endocrine: Negative  Negative for cold intolerance  Genitourinary: Negative  Negative for dysuria, frequency and urgency  Musculoskeletal: Negative  Negative for gait problem, myalgias and neck pain  Skin: Negative  Negative for rash  Allergic/Immunologic: Negative  Neurological: Positive for headaches  Negative for dizziness, tremors, seizures, syncope, facial asymmetry, speech difficulty, weakness, light-headedness and numbness  Hematological: Negative  Does not bruise/bleed easily  Psychiatric/Behavioral: Negative    Negative for confusion, hallucinations and sleep disturbance

## 2023-08-16 ENCOUNTER — OFFICE VISIT (OUTPATIENT)
Dept: FAMILY MEDICINE CLINIC | Facility: CLINIC | Age: 33
End: 2023-08-16
Payer: COMMERCIAL

## 2023-08-16 VITALS
HEART RATE: 68 BPM | OXYGEN SATURATION: 100 % | BODY MASS INDEX: 27.92 KG/M2 | RESPIRATION RATE: 18 BRPM | DIASTOLIC BLOOD PRESSURE: 84 MMHG | HEIGHT: 68 IN | TEMPERATURE: 98.1 F | SYSTOLIC BLOOD PRESSURE: 120 MMHG | WEIGHT: 184.2 LBS

## 2023-08-16 DIAGNOSIS — M54.2 CERVICAL MUSCLE PAIN: ICD-10-CM

## 2023-08-16 DIAGNOSIS — G44.209 TENSION HEADACHE: Primary | ICD-10-CM

## 2023-08-16 PROCEDURE — 99395 PREV VISIT EST AGE 18-39: CPT | Performed by: FAMILY MEDICINE

## 2023-08-16 NOTE — ASSESSMENT & PLAN NOTE
Recommend physical therapy. 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

## 2023-08-16 NOTE — PROGRESS NOTES
ADULT ANNUAL [facility]    NAME: Kimberly Holt  AGE: 33 y.o. SEX: male  : 1990     DATE: 2023     Assessment and Plan:     Problem List Items Addressed This Visit        Other    Tension headache - Primary     Left neck and trapezius pain mild with restricted ROM. Recommend HEP and PT. Cervical muscle pain     Recommend physical therapy. 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 Immunizations and preventive care screenings were discussed with patient today. Appropriate education was printed on patient's after visit summary. Counseling:  Alcohol/drug use: discussed moderation in alcohol intake, the recommendations for healthy alcohol use, and avoidance of illicit drug use. Dental Health: discussed importance of regular tooth brushing, flossing, and dental visits. Injury prevention: discussed safety/seat belts, safety helmets, smoke detectors, carbon dioxide detectors, and smoking near bedding or upholstery. Sexual health: discussed sexually transmitted diseases, partner selection, use of condoms, avoidance of unintended pregnancy, and contraceptive alternatives. · Exercise: the importance of regular exercise/physical activity was discussed. Recommend exercise 3-5 times per week for at least 30 minutes. No follow-ups on file. Chief Complaint:     Chief Complaint   Patient presents with   • Follow-up     Also having headaches/ear pain/shoulder pain      History of Present Illness:     Adult Annual Physical   Patient here for a comprehensive physical exam. The patient reports no problems. Diet and Physical Activity  · Diet/Nutrition: well balanced diet.    · Exercise: no formal exercise. Depression Screening  PHQ-2/9 Depression Screening    Little interest or pleasure in doing things: 0 - not at all  Feeling down, depressed, or hopeless: 0 - not at all  PHQ-2 Score: 0  PHQ-2 Interpretation: Negative depression screen       General Health  · Sleep: sleeps well. · Hearing: normal - bilateral.  · Vision: no vision problems. · Dental: regular dental visits.  Health  · History of STDs?: no.     Review of Systems:     Review of Systems   Constitutional: Negative for chills, fatigue and fever. HENT: Negative for congestion, nosebleeds, rhinorrhea, sinus pressure and sore throat. Eyes: Negative for discharge and redness. Respiratory: Negative for cough and shortness of breath. Cardiovascular: Negative for chest pain, palpitations and leg swelling. Gastrointestinal: Negative for abdominal pain, blood in stool and nausea. Endocrine: Negative for cold intolerance, heat intolerance and polyuria. Genitourinary: Negative for dysuria and frequency. Musculoskeletal: Positive for arthralgias and myalgias. Negative for back pain. Skin: Negative for rash. Neurological: Negative for dizziness, weakness and headaches. Hematological: Negative for adenopathy. Psychiatric/Behavioral: Negative for behavioral problems and sleep disturbance. The patient is not nervous/anxious. Past Medical History:     Past Medical History:   Diagnosis Date   • Anxiety 12/14/2021   • Chest pain    • GERD (gastroesophageal reflux disease)     Resolved 9/23*22      Past Surgical History:     Past Surgical History:   Procedure Laterality Date   • DENTAL IMPLANT     • EGD     • NASAL/SINUS ENDOSCOPY Bilateral 9/28/2022    Procedure: FESS IMAGED GUIDED, BL maxillary sinusotomies, BL total Ethmoidectomies remove polyp, BL frontal exploration remove tissue and polyp; Surgeon:  Haresh Butler DO;  Location: [facility];  Service: ENT      Social History:     Social History Socioeconomic History   • Marital status: Single     Spouse name: None   • Number of children: None   • Years of education: None   • Highest education level: None   Occupational History   • None   Tobacco Use   • Smoking status: Never   • Smokeless tobacco: Never   Vaping Use   • Vaping Use: Never used   Substance and Sexual Activity   • Alcohol use: Yes     Comment: Occasionally 1x per month   • Drug use: Never   • Sexual activity: Yes   Other Topics Concern   • None   Social History Narrative   • None     Social Determinants of Health     Financial Resource Strain: Not on file   Food Insecurity: Not on file   Transportation Needs: Not on file   Physical Activity: Not on file   Stress: Not on file   Social Connections: Not on file   Intimate Partner Violence: Not on file   Housing Stability: Not on file      Family History:     Family History   Problem Relation Age of Onset   • Thyroid disease Mother    • No Known Problems Father       Current Medications:     Current Outpatient Medications   Medication Sig Dispense Refill   • acetaminophen (TYLENOL) 500 mg tablet Take 1,000 mg by mouth every 6 (six) hours as needed for mild pain     • Calcium Ascorbate (VITAMIN C) 500 mg tablet Take 500 mg by mouth daily     • Cholecalciferol (EQL Vitamin D3) 25 MCG (1000 UT) capsule Take 1,000 Units by mouth daily     • Magnesium 500 MG CAPS Take 500 mg by mouth in the morning       No current facility-administered medications for this visit. Allergies:     No Known Allergies   Physical Exam:     /84 (BP Location: Left arm, Patient Position: Sitting, Cuff Size: Standard)   Pulse 68   Temp 98.1 °F (36.7 °C) (Tympanic)   Resp 18   Ht 5' 8" (1.727 m)   Wt 83.6 kg (184 lb 3.2 oz)   SpO2 100%   BMI 28.01 kg/m²     Physical Exam  Vitals and nursing note reviewed. Constitutional:       General: He is not in acute distress. Appearance: Normal appearance. He is well-developed.    HENT:      Head: Normocephalic and atraumatic. Right Ear: Tympanic membrane, ear canal and external ear normal.      Left Ear: Tympanic membrane, ear canal and external ear normal.      Nose: Nose normal.      Mouth/Throat:      Mouth: Mucous membranes are moist.      Pharynx: Oropharynx is clear. Eyes:      Extraocular Movements: Extraocular movements intact. Conjunctiva/sclera: Conjunctivae normal.      Pupils: Pupils are equal, round, and reactive to light. Cardiovascular:      Rate and Rhythm: Normal rate and regular rhythm. Pulses: Normal pulses. Heart sounds: Normal heart sounds. No murmur heard. Pulmonary:      Effort: Pulmonary effort is normal. No respiratory distress. Breath sounds: Normal breath sounds. Abdominal:      General: Bowel sounds are normal.      Palpations: Abdomen is soft. Tenderness: There is no abdominal tenderness. Musculoskeletal:         General: No swelling. Normal range of motion. Cervical back: Normal range of motion and neck supple. Skin:     General: Skin is warm and dry. Capillary Refill: Capillary refill takes less than 2 seconds. Neurological:      General: No focal deficit present. Mental Status: He is alert and oriented to person, place, and time. Mental status is at baseline. Psychiatric:         Mood and Affect: Mood normal.         Behavior: Behavior normal.         Thought Content:  Thought content normal.         Judgment: Judgment normal.          Juan Monroe,    [address] [facility]

## 2023-12-01 ENCOUNTER — EVALUATION (OUTPATIENT)
Dept: PHYSICAL THERAPY | Facility: CLINIC | Age: 33
End: 2023-12-01
Payer: COMMERCIAL

## 2023-12-01 DIAGNOSIS — M54.2 NECK PAIN: ICD-10-CM

## 2023-12-01 DIAGNOSIS — M54.2 CERVICAL MUSCLE PAIN: ICD-10-CM

## 2023-12-01 DIAGNOSIS — G44.209 TENSION HEADACHE: ICD-10-CM

## 2023-12-01 DIAGNOSIS — G44.229 CHRONIC TENSION-TYPE HEADACHE, NOT INTRACTABLE: Primary | ICD-10-CM

## 2023-12-01 PROCEDURE — 97162 PT EVAL MOD COMPLEX 30 MIN: CPT | Performed by: PHYSICAL THERAPIST

## 2023-12-01 PROCEDURE — 97140 MANUAL THERAPY 1/> REGIONS: CPT | Performed by: PHYSICAL THERAPIST

## 2023-12-01 NOTE — PROGRESS NOTES
PT Evaluation     Today's date: 2023  Patient name: Vanessa Avila  : 1990  MRN: [id number]  Referring provider: Tanvir Bañuelos DO  Dx:   Encounter Diagnosis     ICD-10-CM    1. Chronic tension-type headache, not intractable  G44.229       2. Neck pain  M54.2       3. Tension headache  G44.209 Ambulatory Referral to Physical Therapy      4. Cervical muscle pain  M54.2 Ambulatory Referral to Physical Therapy                     Assessment  Assessment details: Vanessa Avila is a 29 y.o. male who presents with complaints of chronic neck pain and tension type headaches. No further referral appears necessary at this time based upon examination results. Patient presents with neck pain with mobility deficits as well as neck pain with headaches. Patient specifically with limited upper cervical mobility leading to limited capital flexion and rotation. SO tightness contributing to headaches. Etiologic factors include repetitive poor body mechanics, prolonged sitting activity at work/desk work, and h/o dental and sinus surgery within the last 2 years. Prognosis is good given HEP compliance and PT 2x/wk. Please contact me if you have any questions or recommendations. Thank you for the opportunity to share in  [address] [place] [address].      Impairments: abnormal muscle firing, abnormal or restricted ROM, activity intolerance, impaired physical strength, lacks appropriate home exercise program, pain with function and poor posture   Functional limitations: limited head turning R>L, limited tolerance to deskwork, avoiding weight lifting UEsUnderstanding of Dx/Px/POC: good   Prognosis: good    Goals  STGs  1) In 4 weeks patient will demonstrate >60 deg cervical rot b/l  2) In 4 weeks patient will report 3 points reduced pain  3) In 4 weeks patient will report 50% reduction in headaches and symptoms of "tingling"    LTGs  1) In 6-8 weeks patient will report at least 80% reduction in all symptoms  2) In 6-8 weeks patient will resume full workout activity without restrictions  3) In 6-8 weeks patient will tolerate work activity without exacerbation of symptoms    Plan  Patient would benefit from: skilled physical therapy  Referral necessary: No  Planned modality interventions: TENS, traction, thermotherapy: hydrocollator packs and cryotherapy  Planned therapy interventions: IASTM, joint mobilization, kinesiology taping, manual therapy, massage, Shin taping, nerve gliding, neuromuscular re-education, patient education, postural training, self care, strengthening, stretching, therapeutic activities, therapeutic exercise, flexibility, functional ROM exercises and home exercise program  Frequency: 2x week  Duration in weeks: 8  Plan of Care beginning date: 12/1/2023  Plan of Care expiration date: 1/26/2024  Treatment plan discussed with: patient        Subjective Evaluation    History of Present Illness  Mechanism of injury: -c/o symptoms since the start of this year  -symptoms include intermittent tingling in L temple and into L ear (occurs daily or every other day)  -denies difficulty hearing or tinnitus  -pain also present sometimes behind L ear and into L shoulder blade, down L UE intermittently into hand (occurs 1-2x/week)  -pain in L UE is short-lived, only lasts a few seconds  -headaches occur 1-3x/month  -notices ache along L base of skull 2-3x/week  -denies any visual disturbances, nausea, dizziness  -denies weakness in the UE  -takes Tylenol or Advil prn for headaches  -symptoms seem to be related to posture and work activity (works from home - )  -PMH significant for dental surgery (implants on L side) in 2022, sinus surgery in 2022 but did not notice onset of symptoms until after these procedures  -no recent imaging but in the past (2022) had CT scans of neck and head which were unremarkable  -had 2 chiropractor appointments last month but did not notice any improvement  -does notice some clicking on L side of jaw at times  Patient Goals  Patient goal: get rid of tingling sensation, get rid of pain and headaches  Pain  Current pain ratin  At best pain ratin  At worst pain ratin    Social Support    Employment status: working  Hand dominance: left  Exercise history: runs and walks on TM or outside, some weight lifting    Treatments  Previous treatment: chiropractic        Objective     Concurrent Complaints  Positive for headaches. Negative for night pain, disturbed sleep, dizziness, faints, nausea/motion sickness, tinnitus, trouble swallowing, difficulty breathing, shortness of breath, respiratory pain and visual change    Palpation   Left   Tenderness of the sternocleidomastoid, suboccipitals, upper trapezius, masseter and temporalis. Right   Tenderness of the upper trapezius.      Neurological Testing     Sensation   Cervical/Thoracic   Left   Intact: light touch    Right   Intact: light touch    Reflexes   Left   Biceps (C5/C6): trace (1+)  Brachioradialis (C6): trace (1+)  Triceps (C7): trace (1+)    Right   Biceps (C5/C6): trace (1+)  Brachioradialis (C6): trace (1+)  Triceps (C7): trace (1+)    Active Range of Motion   Cervical/Thoracic Spine       Cervical  Subcranial protraction:  WFL   Subcranial retraction:   Restriction level: minimal  Flexion: Neck active flexion: chin to chest.  WFL  Extension: 64 degrees      Left lateral flexion: 38 degrees      Right lateral flexion: 33 degrees      Left rotation: 62 ("tension" on L side of neck) degrees  Right rotation: 55 degrees       Additional Active Range of Motion Details  Increased sensation of "tingling" on L side of temple and ear with end range cervical flexion and extension, also with end range retraction and protraction    Strength/Myotome Testing   Cervical Spine     Left   Normal strength    Right   Normal strength    Tests   Cervical   Positive neck flexor muscle endurance test.  Negative vertical compression, cervical distraction, alar ligament test, transverse ligament test and VBI. Left   Negative Spurling's Test A, Spurling's Test B and cervical flexion-rotation test.     Right   Negative Spurling's Test A, Spurling's Test B and cervical flexion-rotation test.     Lumbar   Negative vertical compression. TMJ   Jaw observations: within normal limits  Joint sounds left: normal  Joint sounds right: normal  ROM: limited  Opening (mm): within normal limits   Lateral excursion, left (mm): within normal limits  Lateral excursion, right (mm)t: within normal limits   Neuro Exam:     Headaches   Patient reports headaches: Yes.               Precautions: none  POC exp 1/26/24    Manuals 12/1            SOR KG            Cervical traction KG            Cervical rotation JMs KG  Gr II b/l            UT and levator scap stretching             Neuro Re-Ed             UBE retro for posture             Chin tucks             DNF endurance             TB rows             TB pull downs             TB horizontal abduction             TB b/l ER             Prone ITY             Prone on elbows chin tuck             Prone serratus presses                                       Ther Ex             SCM stretch             UT stretch             Levator scap stretch             Thoracic ext over chair             Pec stretch             SNAG rotations             Thoracic wall angels                          Ther Activity                                       Gait Training                                       Modalities

## 2023-12-04 ENCOUNTER — OFFICE VISIT (OUTPATIENT)
Dept: PHYSICAL THERAPY | Facility: CLINIC | Age: 33
End: 2023-12-04
Payer: COMMERCIAL

## 2023-12-04 DIAGNOSIS — M54.2 NECK PAIN: ICD-10-CM

## 2023-12-04 DIAGNOSIS — G44.229 CHRONIC TENSION-TYPE HEADACHE, NOT INTRACTABLE: Primary | ICD-10-CM

## 2023-12-04 DIAGNOSIS — M54.2 CERVICAL MUSCLE PAIN: ICD-10-CM

## 2023-12-04 DIAGNOSIS — G44.209 TENSION HEADACHE: ICD-10-CM

## 2023-12-04 PROCEDURE — 97112 NEUROMUSCULAR REEDUCATION: CPT | Performed by: PHYSICAL THERAPIST

## 2023-12-04 PROCEDURE — 97140 MANUAL THERAPY 1/> REGIONS: CPT | Performed by: PHYSICAL THERAPIST

## 2023-12-04 NOTE — PROGRESS NOTES
Daily Note     Today's date: 2023  Patient name: Thuan Grissom  : 1990  MRN: [id number]  Referring provider: Deepa Flower DO  Dx:   Encounter Diagnosis     ICD-10-CM    1. Chronic tension-type headache, not intractable  G44.229       2. Neck pain  M54.2       3. Tension headache  G44.209       4. Cervical muscle pain  M54.2                      Subjective: Patient reports he felt a little better this weekend. Objective: See treatment diary below      Assessment: Tolerated treatment well. Slight burning sensation in L ear during UBE exercise, resolved quickly. Patient demonstrated fatigue post treatment, exhibited good technique with therapeutic exercises, and would benefit from continued PT      Plan: Continue per plan of care. Progress treatment as tolerated. Provided patient with written HEP today. Precautions: none  POC exp 24  HEP Access Code: [email or web address] - provided at 23  Manuals       SOR KG KG      Cervical traction KG KG      Cervical rotation JMs KG  Gr II b/l       UT and levator scap stretching  KG      Neuro Re-Ed        UBE retro for posture  120 rpm  6 mins      Chin tucks  Supine  5" 2x10      DNF endurance        TB rows        TB pull downs        TB horizontal abduction        TB b/l ER        Prone ITY        Prone on elbows chin tuck        Prone serratus presses        Supine serratus press  5# 3" 2x10              Ther Ex        SCM stretch  15" x4 ea b/l      UT stretch  15" x4 ea b/l      Levator scap stretch  15" x4 ea b/l      Thoracic ext over chair        Pec stretch        SNAG rotations        Thoracic wall angels                Ther Activity                        Gait Training                        Modalities                             Access Code: [email or web address]  URL: [email or web address]  Date: 2023  Prepared by: Jorge Alberto Martin    Exercises  - Seated Upper Trapezius Stretch  - 2 x daily - 7 x weekly - 4 reps - 15 sec hold  - Seated Levator Scapulae Stretch  - 2 x daily - 7 x weekly - 4 reps - 15 sec hold  - Sternocleidomastoid Stretch  - 2 x daily - 7 x weekly - 4 reps - 15 sec hold

## 2023-12-08 ENCOUNTER — OFFICE VISIT (OUTPATIENT)
Dept: PHYSICAL THERAPY | Facility: CLINIC | Age: 33
End: 2023-12-08
Payer: COMMERCIAL

## 2023-12-08 DIAGNOSIS — G44.209 TENSION HEADACHE: ICD-10-CM

## 2023-12-08 DIAGNOSIS — M54.2 CERVICAL MUSCLE PAIN: ICD-10-CM

## 2023-12-08 DIAGNOSIS — G44.229 CHRONIC TENSION-TYPE HEADACHE, NOT INTRACTABLE: Primary | ICD-10-CM

## 2023-12-08 DIAGNOSIS — M54.2 NECK PAIN: ICD-10-CM

## 2023-12-08 PROCEDURE — 97110 THERAPEUTIC EXERCISES: CPT | Performed by: PHYSICAL THERAPIST

## 2023-12-08 PROCEDURE — 97112 NEUROMUSCULAR REEDUCATION: CPT | Performed by: PHYSICAL THERAPIST

## 2023-12-08 PROCEDURE — 97140 MANUAL THERAPY 1/> REGIONS: CPT | Performed by: PHYSICAL THERAPIST

## 2023-12-08 NOTE — PROGRESS NOTES
Daily Note     Today's date: 2023  Patient name: Geri Grewal  : 1990  MRN: [id number]  Referring provider: Adolfo Moreno DO  Dx:   Encounter Diagnosis     ICD-10-CM    1. Chronic tension-type headache, not intractable  G44.229       2. Neck pain  M54.2       3. Tension headache  G44.209       4. Cervical muscle pain  M54.2                      Subjective: Patient reports he has been doing his stretches at home. Feels better after stretches. He is still getting symptoms on his L side. Objective: See treatment diary below      Assessment: Progressed through outlined POC without incident. Tolerated treatment well. Patient demonstrated fatigue post treatment, exhibited good technique with therapeutic exercises, and would benefit from continued PT      Plan: Continue per plan of care. Progress treatment as tolerated.        Precautions: none  POC exp 24  HEP Access Code: [email or web address] - provided at 23  Manuals      SOR KG KG KG     Cervical traction KG KG KG     Cervical rotation JMs KG  Gr II b/l  KG     UT and levator scap stretching  KG KG     Neuro Re-Ed        UBE retro for posture  120 rpm  6 mins 120 rpm   6 mins       Chin tucks  Supine  5" 2x10 Supine  5" 2x10     DNF endurance        TB rows        TB pull downs        TB horizontal abduction        TB b/l ER        Prone ITY        Prone on elbows chin tuck        Prone serratus presses        Supine serratus press  5# 3" 2x10 5# 3" x20             Ther Ex        SCM stretch  15" x4 ea b/l 15" x4 ea b/l     UT stretch  15" x4 ea b/l 15" x4 ea b/l     Levator scap stretch  15" x4 ea b/l 15" x4 ea b/l     Thoracic ext over chair   5" x10     Pec stretch   Doorway  30"x4     SNAG rotations   W/Mulligan strap  3" x10 ea b/l     Thoracic wall angels                Ther Activity                        Gait Training                        Modalities

## 2023-12-11 ENCOUNTER — OFFICE VISIT (OUTPATIENT)
Dept: PHYSICAL THERAPY | Facility: CLINIC | Age: 33
End: 2023-12-11
Payer: COMMERCIAL

## 2023-12-11 DIAGNOSIS — M54.2 NECK PAIN: ICD-10-CM

## 2023-12-11 DIAGNOSIS — M54.2 CERVICAL MUSCLE PAIN: ICD-10-CM

## 2023-12-11 DIAGNOSIS — G44.209 TENSION HEADACHE: ICD-10-CM

## 2023-12-11 DIAGNOSIS — G44.229 CHRONIC TENSION-TYPE HEADACHE, NOT INTRACTABLE: Primary | ICD-10-CM

## 2023-12-11 PROCEDURE — 97110 THERAPEUTIC EXERCISES: CPT

## 2023-12-11 PROCEDURE — 97112 NEUROMUSCULAR REEDUCATION: CPT

## 2023-12-11 PROCEDURE — 97140 MANUAL THERAPY 1/> REGIONS: CPT

## 2023-12-11 NOTE — PROGRESS NOTES
Daily Note     Today's date: 2023  Patient name: Nereida Britton  : 1990  MRN: [id number]  Referring provider: Margy Bonilla DO  Dx:   Encounter Diagnosis     ICD-10-CM    1. Chronic tension-type headache, not intractable  G44.229       2. Neck pain  M54.2       3. Tension headache  G44.209       4. Cervical muscle pain  M54.2                      Subjective: Pt reported no changes in his symptoms since the start of PT. He continues to complaint of headaches in the area of the L temporal lobe. Objective: See treatment diary below      Assessment: Initiated multiple TB exercises to facilitate strength. Tolerated treatment well. Patient demonstrated fatigue post treatment, exhibited good technique with therapeutic exercises, and would benefit from continued PT      Plan: IContinue per plan of care. Progress treatment as tolerated.        Precautions: none  POC exp 24  HEP Access Code: [email or web address] - provided at 23  Manuals     SOR KG KG KG TM    Cervical traction KG KG KG TM w/ towel    Cervical rotation JMs KG  Gr II b/l  KG     UT and levator scap stretching  KG KG TM    Neuro Re-Ed        UBE retro for posture  120 rpm  6 mins 120 rpm   6 mins   120 rpm   8 mins    Chin tucks  Supine  5" 2x10 Supine  5" 2x10 Supine  5" 2x10    DNF endurance        TB rows    Blue 5" x10    TB pull downs    Blue 5" x10    TB horizontal abduction    NV    TB b/l ER    NV    Prone ITY        Prone on elbows chin tuck        Prone serratus presses        Supine serratus press  5# 3" 2x10 5# 3" x20 5# 3" x20            Ther Ex        SCM stretch  15" x4 ea b/l 15" x4 ea b/l 15" x4 ea b/l    UT stretch  15" x4 ea b/l 15" x4 ea b/l 15" x4 ea b/l    Levator scap stretch  15" x4 ea b/l 15" x4 ea b/l 15" x4 ea b/l    Thoracic ext over chair   5" x10 5" x10    Pec stretch   Doorway  30"x4 Doorway  30"x4    SNAG rotations   W/Mulligan strap  3" x10 ea b/l W/Mulligan strap  3" x10 ea b/l Thoracic wall angels                Ther Activity                        Gait Training                        Modalities

## 2023-12-14 ENCOUNTER — OFFICE VISIT (OUTPATIENT)
Dept: PHYSICAL THERAPY | Facility: CLINIC | Age: 33
End: 2023-12-14
Payer: COMMERCIAL

## 2023-12-14 DIAGNOSIS — M54.2 NECK PAIN: ICD-10-CM

## 2023-12-14 DIAGNOSIS — G44.209 TENSION HEADACHE: ICD-10-CM

## 2023-12-14 DIAGNOSIS — M54.2 CERVICAL MUSCLE PAIN: ICD-10-CM

## 2023-12-14 DIAGNOSIS — G44.229 CHRONIC TENSION-TYPE HEADACHE, NOT INTRACTABLE: Primary | ICD-10-CM

## 2023-12-14 PROCEDURE — 97140 MANUAL THERAPY 1/> REGIONS: CPT

## 2023-12-14 PROCEDURE — 97112 NEUROMUSCULAR REEDUCATION: CPT

## 2023-12-14 PROCEDURE — 97012 MECHANICAL TRACTION THERAPY: CPT

## 2023-12-14 PROCEDURE — 97110 THERAPEUTIC EXERCISES: CPT

## 2023-12-14 NOTE — PROGRESS NOTES
Daily Note     Today's date: 2023  Patient name: Mason Tena  : 1990  MRN: [id number]  Referring provider: Lucinda Cook DO  Dx:   Encounter Diagnosis     ICD-10-CM    1. Chronic tension-type headache, not intractable  G44.229       2. Neck pain  M54.2       3. Tension headache  G44.209       4. Cervical muscle pain  M54.2                      Subjective: Pt reported no changes in his symptoms. He continues to c/o L temporal headaches. He is concerned that his pain may be coming from the TMJ. He denied contraindications to mechanical traction prior to PT. Objective: See treatment diary below      Assessment: Tolerated treatment well. Initiated mechanical cervical traction with good tolerance and no adverse affect. Patient demonstrated fatigue post treatment, exhibited good technique with therapeutic exercises, and would benefit from continued PT      Plan: Continue per plan of care. Progress treatment as tolerated.        Precautions: none  POC exp 24  HEP Access Code: [email or web address] - provided at 23  Manuals    SOR KG KG KG TM    Cervical traction KG KG KG TM w/ towel    Cervical rotation JMs KG  Gr II b/l  KG     UT and levator scap stretching  KG KG TM TM   Neuro Re-Ed        UBE retro for posture  120 rpm  6 mins 120 rpm   6 mins   120 rpm   8 mins 120 rpm   8 mins   Chin tucks  Supine  5" 2x10 Supine  5" 2x10 Supine  5" 2x10 Supine  5" 2x10   DNF endurance        TB rows    Blue 5" x10 BTB 5" 2x10   TB pull downs    Blue 5" x10 BTB 5" 2x10   TB horizontal abduction    NV BTB x20   TB b/l ER    NV BTB x20   Prone ITY        Prone on elbows chin tuck        Prone serratus presses        Supine serratus press  5# 3" 2x10 5# 3" x20 5# 3" x20 Resume NV           Ther Ex        SCM stretch  15" x4 ea b/l 15" x4 ea b/l 15" x4 ea b/l 15" x4 ea b/l   UT stretch  15" x4 ea b/l 15" x4 ea b/l 15" x4 ea b/l 15" x4 ea b/l   Levator scap stretch  15" x4 ea b/l 15" x4 ea b/l 15" x4 ea b/l 15" x4 ea b/l   Thoracic ext over chair   5" x10 5" x10 5" x20   Pec stretch   Doorway  30"x4 Doorway  30"x4 Doorway  30"x4   SNAG rotations   W/Mulligan strap  3" x10 ea b/l W/Mulligan strap  3" x10 ea b/l W/Mulligan strap  3" x10 ea b/l   Thoracic wall angels                Ther Activity                        Gait Training                        Modalities        Mechanical cervical traction      Intermittent 15# max/10# min @ 15* w/ 30% rope speed and 3 step up/down x8 min

## 2023-12-18 ENCOUNTER — OFFICE VISIT (OUTPATIENT)
Dept: PHYSICAL THERAPY | Facility: CLINIC | Age: 33
End: 2023-12-18
Payer: COMMERCIAL

## 2023-12-18 DIAGNOSIS — G44.209 TENSION HEADACHE: ICD-10-CM

## 2023-12-18 DIAGNOSIS — M54.2 NECK PAIN: Primary | ICD-10-CM

## 2023-12-18 DIAGNOSIS — M54.2 CERVICAL MUSCLE PAIN: ICD-10-CM

## 2023-12-18 DIAGNOSIS — G44.229 CHRONIC TENSION-TYPE HEADACHE, NOT INTRACTABLE: ICD-10-CM

## 2023-12-18 PROCEDURE — 97140 MANUAL THERAPY 1/> REGIONS: CPT | Performed by: PHYSICAL THERAPIST

## 2023-12-18 PROCEDURE — 97012 MECHANICAL TRACTION THERAPY: CPT | Performed by: PHYSICAL THERAPIST

## 2023-12-18 PROCEDURE — 97112 NEUROMUSCULAR REEDUCATION: CPT | Performed by: PHYSICAL THERAPIST

## 2023-12-18 NOTE — PROGRESS NOTES
"Daily Note     Today's date: 2023  Patient name: Jarrett Marie  : 1990  MRN: [id number]  Referring provider: El Currie DO  Dx:   Encounter Diagnosis     ICD-10-CM    1. Chronic tension-type headache, not intractable  G44.229       2. Neck pain  M54.2       3. Tension headache  G44.209       4. Cervical muscle pain  M54.2                      Subjective: Patient reports \"marginal\" improvement in symptoms since starting PT. No noticeable changes since initiating traction treatment last visit.       Objective: See treatment diary below      Assessment: Tolerated treatment well. Added prone ITY exercise to strengthen scapular mm for improved postural control. Patient demonstrated fatigue post treatment, exhibited good technique with therapeutic exercises, and would benefit from continued PT      Plan: Continue per plan of care.  Progress treatment as tolerated.       Precautions: none  POC exp 24  HEP Access Code: [email or web address] - provided at 23  Manuals    SOR KG KG KG TM    Cervical traction mechanical KG KG TM w/ towel    Cervical rotation JMs KG  KG     UT and levator scap stretching KG KG KG TM TM   Neuro Re-Ed        UBE retro for posture 120 rpm  8 mins 120 rpm  6 mins 120 rpm   6 mins   120 rpm   8 mins 120 rpm   8 mins   Chin tucks Supine  5\" x20 Supine  5\" 2x10 Supine  5\" 2x10 Supine  5\" 2x10 Supine  5\" 2x10   DNF endurance Supine  5\" 1x10       TB rows Blue TB  5\" x20   Blue 5\" x10 BTB 5\" 2x10   TB pull downs Blue TB  5\" x20   Blue 5\" x10 BTB 5\" 2x10   TB horizontal abduction Blue TB  x20   NV BTB x20   TB b/l ER Blue TB  x20   NV BTB x20   Prone ITY 3\" 1x10 ea       Prone on elbows chin tuck        Prone serratus presses        Supine serratus press 5# 5\" x20 5# 3\" 2x10 5# 3\" x20 5# 3\" x20 Resume NV           Ther Ex        SCM stretch  15\" x4 ea b/l 15\" x4 ea b/l 15\" x4 ea b/l 15\" x4 ea b/l   UT stretch  15\" x4 ea b/l 15\" x4 ea b/l 15\" x4 ea b/l 15\" " "x4 ea b/l   Levator scap stretch  15\" x4 ea b/l 15\" x4 ea b/l 15\" x4 ea b/l 15\" x4 ea b/l   Thoracic ext over chair   5\" x10 5\" x10 5\" x20   Pec stretch   Doorway  30\"x4 Doorway  30\"x4 Doorway  30\"x4   SNAG rotations   W/Mulligan strap  3\" x10 ea b/l W/Mulligan strap  3\" x10 ea b/l W/Mulligan strap  3\" x10 ea b/l   Thoracic wall angels                Ther Activity                        Gait Training                        Modalities        Mechanical cervical traction  Intermittent 15# max/10# min @ 15* w/ 30% rope speed and 3 step up/down x8 min    Intermittent 15# max/10# min @ 15* w/ 30% rope speed and 3 step up/down x8 min                        "

## 2023-12-21 ENCOUNTER — APPOINTMENT (OUTPATIENT)
Dept: PHYSICAL THERAPY | Facility: CLINIC | Age: 33
End: 2023-12-21
Payer: COMMERCIAL

## 2023-12-26 ENCOUNTER — APPOINTMENT (OUTPATIENT)
Dept: PHYSICAL THERAPY | Facility: CLINIC | Age: 33
End: 2023-12-26
Payer: COMMERCIAL

## 2023-12-27 ENCOUNTER — OFFICE VISIT (OUTPATIENT)
Dept: FAMILY MEDICINE CLINIC | Facility: CLINIC | Age: 33
End: 2023-12-27
Payer: COMMERCIAL

## 2023-12-27 VITALS
BODY MASS INDEX: 27.55 KG/M2 | TEMPERATURE: 97.8 F | SYSTOLIC BLOOD PRESSURE: 122 MMHG | WEIGHT: 181.8 LBS | OXYGEN SATURATION: 99 % | RESPIRATION RATE: 18 BRPM | HEIGHT: 68 IN | DIASTOLIC BLOOD PRESSURE: 80 MMHG | HEART RATE: 66 BPM

## 2023-12-27 DIAGNOSIS — G44.209 TENSION HEADACHE: Primary | ICD-10-CM

## 2023-12-27 DIAGNOSIS — M54.2 CERVICAL MUSCLE PAIN: ICD-10-CM

## 2023-12-27 PROCEDURE — 99213 OFFICE O/P EST LOW 20 MIN: CPT | Performed by: FAMILY MEDICINE

## 2023-12-27 NOTE — PROGRESS NOTES
"Assessment/Plan:       Problem List Items Addressed This Visit          Other    Tension headache - Primary     Still persistent but improved.  Still has temporal headache pain and he is adjusting his workstation and completed physical therapy and working on home exercise program we again reviewed neck exercises and strengthening         Cervical muscle pain     Completed physical therapy will now work on a home exercise program strengthening and stretching neck muscles              Subjective:      Patient ID: Jarrett Marie is a 33 y.o. male.    Follow-up evaluation for neck pain and discussion regarding neuropathy        The following portions of the patient's history were reviewed and updated as appropriate: allergies, current medications, past family history, past medical history, past social history, past surgical history and problem list.    Review of Systems   Constitutional:  Negative for chills, fatigue and fever.   HENT:  Negative for congestion, nosebleeds, rhinorrhea, sinus pressure and sore throat.    Eyes:  Negative for discharge and redness.   Respiratory:  Negative for cough and shortness of breath.    Cardiovascular:  Negative for chest pain, palpitations and leg swelling.   Gastrointestinal:  Negative for abdominal pain, blood in stool and nausea.   Endocrine: Negative for cold intolerance, heat intolerance and polyuria.   Genitourinary:  Negative for dysuria and frequency.   Musculoskeletal:  Positive for neck pain. Negative for arthralgias, back pain and myalgias.   Skin:  Negative for rash.   Neurological:  Negative for dizziness, weakness and headaches.   Hematological:  Negative for adenopathy.   Psychiatric/Behavioral:  Negative for behavioral problems and sleep disturbance. The patient is not nervous/anxious.          Objective:      /80 (BP Location: Left arm, Patient Position: Sitting, Cuff Size: Standard)   Pulse 66   Temp 97.8 °F (36.6 °C) (Tympanic)   Resp 18   Ht 5' 8\" " (1.727 m)   Wt 82.5 kg (181 lb 12.8 oz)   SpO2 99%   BMI 27.64 kg/m²        Physical Exam  Vitals and nursing note reviewed.   Constitutional:       Appearance: Normal appearance. He is well-developed.   HENT:      Head: Normocephalic and atraumatic.      Right Ear: External ear normal.      Left Ear: External ear normal.      Nose: Nose normal.      Mouth/Throat:      Mouth: Mucous membranes are moist.   Eyes:      General: No scleral icterus.     Conjunctiva/sclera: Conjunctivae normal.      Pupils: Pupils are equal, round, and reactive to light.   Neck:      Thyroid: No thyromegaly.      Vascular: No JVD.   Cardiovascular:      Rate and Rhythm: Normal rate and regular rhythm.      Heart sounds: Normal heart sounds. No murmur heard.  Pulmonary:      Effort: Pulmonary effort is normal.      Breath sounds: Normal breath sounds. No wheezing or rales.   Chest:      Chest wall: No tenderness.   Abdominal:      General: Bowel sounds are normal. There is no distension.      Palpations: Abdomen is soft. There is no mass.      Tenderness: There is no abdominal tenderness. There is no guarding or rebound.   Musculoskeletal:         General: No tenderness or deformity. Normal range of motion.      Cervical back: Normal range of motion and neck supple.   Lymphadenopathy:      Cervical: No cervical adenopathy.   Skin:     General: Skin is warm and dry.      Capillary Refill: Capillary refill takes less than 2 seconds.      Findings: No erythema or rash.   Neurological:      General: No focal deficit present.      Mental Status: He is alert and oriented to person, place, and time.      Cranial Nerves: No cranial nerve deficit.      Deep Tendon Reflexes: Reflexes are normal and symmetric. Reflexes normal.   Psychiatric:         Mood and Affect: Mood normal.         Behavior: Behavior normal.         Thought Content: Thought content normal.         Judgment: Judgment normal.          Data:    Laboratory Results: I have  "personally reviewed the pertinent laboratory results/reports   Radiology/Other Diagnostic Testing Results: I have personally reviewed pertinent reports.       Lab Results   Component Value Date    WBC 4.73 02/15/2023    HGB 13.9 02/15/2023    HCT 43.6 02/15/2023    MCV 87 02/15/2023     02/15/2023     Lab Results   Component Value Date     08/20/2014    K 4.2 02/15/2023     02/15/2023    CO2 26 02/15/2023    ANIONGAP 7 08/20/2014    BUN 14 02/15/2023    CREATININE 0.98 02/15/2023    GLUCOSE 84 08/20/2014    GLUF 91 02/15/2023    CALCIUM 9.4 02/15/2023    AST 40 02/15/2023    ALT 41 02/15/2023    ALKPHOS 59 02/15/2023    PROT 7.5 10/31/2014    BILITOT 1.3 (H) 10/31/2014    EGFR 102 02/15/2023     Lab Results   Component Value Date    CHOLESTEROL 192 02/15/2023    CHOLESTEROL 196 04/12/2022    CHOLESTEROL 228 (H) 11/28/2021     Lab Results   Component Value Date    HDL 59 02/15/2023    HDL 54 04/12/2022    HDL 50 11/28/2021     Lab Results   Component Value Date    LDLCALC 115 (H) 02/15/2023    LDLCALC 127 (H) 04/12/2022    LDLCALC 139 (H) 11/28/2021     Lab Results   Component Value Date    TRIG 90 02/15/2023    TRIG 73 04/12/2022    TRIG 193 (H) 11/28/2021     No results found for: \"CHOLHDL\"  Lab Results   Component Value Date    WDN2UBTMGEUP 1.420 02/15/2023     No results found for: \"HGBA1C\"  No results found for: \"PSA\"    El Currie, DO        "

## 2023-12-27 NOTE — PROGRESS NOTES
BMI Counseling: Body mass index is 27.64 kg/m². The BMI is above normal. Nutrition recommendations include reducing portion sizes, consuming healthier snacks, and increasing intake of lean protein. Exercise recommendations include exercising 3-5 times per week.

## 2023-12-27 NOTE — ASSESSMENT & PLAN NOTE
Still persistent but improved.  Still has temporal headache pain and he is adjusting his workstation and completed physical therapy and working on home exercise program we again reviewed neck exercises and strengthening

## 2023-12-27 NOTE — ASSESSMENT & PLAN NOTE
Completed physical therapy will now work on a home exercise program strengthening and stretching neck muscles

## 2023-12-28 ENCOUNTER — APPOINTMENT (OUTPATIENT)
Dept: PHYSICAL THERAPY | Facility: CLINIC | Age: 33
End: 2023-12-28
Payer: COMMERCIAL

## 2024-01-24 NOTE — PROGRESS NOTES
Diagnoses and all orders for this visit:    Pruritus ani       Pruritus ani  Patient presents for evaluation of intermittent perianal concerns as well as anal itching.  Patient notes twice over the past year has noted anal lump that both resolved on their own without any significant treatment.  He notes that he has had some burning and itching sensation around his anus.  This appears to be more prominent over the past 2 months.  He has intermittently used Preparation H with intermittent benefit.  He has not used this for proxy 1 month.  Examination is benign in the office without significant prolapsing internal hemorrhoidal disease.    In terms of his symptomatology, he has pruritus ani.  We discussed this is an irritated skin condition of the skin around the anus.  This can lead to burning and itching.  I have recommended dietary measures, topical agents such as Calmoseptine, judicious hygiene.  I do not see any surgically treatable pathology such as large internal prolapsing hemorrhoids.  Supporting material was given.  He will return as needed.      JULIANA  Jarrett Marie is here for Rectal itching that has been  on and off for the past few months, he has tried preparation H and fiber.    He reports 1 bowel movement daily that is soft and formed.    He has never had a colonoscopy     Past Medical History:   Diagnosis Date    Anxiety 12/14/2021    Chest pain     GERD (gastroesophageal reflux disease)     Resolved 9/23*22     Past Surgical History:   Procedure Laterality Date    DENTAL IMPLANT      EGD      NASAL/SINUS ENDOSCOPY Bilateral 9/28/2022    Procedure: FESS IMAGED GUIDED, BL maxillary sinusotomies, BL total Ethmoidectomies remove polyp, BL frontal exploration remove tissue and polyp;  Surgeon: Bonifacio Medrano DO;  Location: [facility];  Service: ENT       Current Outpatient Medications:     acetaminophen (TYLENOL) 500 mg tablet, Take 1,000 mg by mouth every 6 (six) hours as needed for mild pain,  Disp: , Rfl:     Calcium Ascorbate (VITAMIN C) 500 mg tablet, Take 500 mg by mouth daily, Disp: , Rfl:     Cholecalciferol (EQL Vitamin D3) 25 MCG (1000 UT) capsule, Take 1,000 Units by mouth daily, Disp: , Rfl:     Magnesium 500 MG CAPS, Take 500 mg by mouth in the morning, Disp: , Rfl:   Allergies as of 01/29/2024    (No Known Allergies)     Review of Systems   Gastrointestinal:  Positive for rectal pain.   All other systems reviewed and are negative.    There were no vitals filed for this visit.  Physical Exam  Constitutional:       Appearance: Normal appearance.   HENT:      Head: Normocephalic and atraumatic.   Eyes:      Extraocular Movements: Extraocular movements intact.      Pupils: Pupils are equal, round, and reactive to light.   Pulmonary:      Effort: Pulmonary effort is normal.   Skin:     General: Skin is warm and dry.   Neurological:      General: No focal deficit present.      Mental Status: He is alert and oriented to person, place, and time.   Psychiatric:         Mood and Affect: Mood normal.         Behavior: Behavior normal.         Thought Content: Thought content normal.         Judgment: Judgment normal.     Lower Endoscopy    Date/Time: 1/29/2024 1:00 PM    Performed by: Filiberto Uribe MD  Authorized by: Filiberto Uribe MD    Verbal consent obtained?: Yes    Risks and benefits: Risks, benefits and alternatives were discussed    Consent given by:  Patient  Indications: rectal irritation    Patient sedated: No    Scope type:  Anoscope  External exam performed: Yes    Pilonidal sinus tract: No    Pilonidal cyst: No    Pilonidal tenderness: No    Perianal skin tags: No    Perirectal warts: No    Perianal maceration: Yes    Perianal induration: No    Perianal erythema: No    External hemorrhoids: No    Digital exam performed: Yes    Laxity of anal sphincter: No    Prostate tenderness: No    Internal hemorrhoids: Yes    Prolapsed: No    Intraluminal mass: No    Inflammation: No     Anal fissures: No    Anal fistulae: No    Anal stricture: No    Abscess: No    Procedure termination:  Procedure complete  Patient tolerance:  Patient tolerated the procedure well with no immediate complications

## 2024-01-29 ENCOUNTER — OFFICE VISIT (OUTPATIENT)
Age: 34
End: 2024-01-29
Payer: COMMERCIAL

## 2024-01-29 VITALS — WEIGHT: 179 LBS | BODY MASS INDEX: 27.13 KG/M2 | HEIGHT: 68 IN

## 2024-01-29 DIAGNOSIS — L29.0 PRURITUS ANI: Primary | ICD-10-CM

## 2024-01-29 PROCEDURE — 99203 OFFICE O/P NEW LOW 30 MIN: CPT | Performed by: COLON & RECTAL SURGERY

## 2024-01-29 PROCEDURE — 46600 DIAGNOSTIC ANOSCOPY SPX: CPT | Performed by: COLON & RECTAL SURGERY

## 2024-01-29 NOTE — ASSESSMENT & PLAN NOTE
Patient presents for evaluation of intermittent perianal concerns as well as anal itching.  Patient notes twice over the past year has noted anal lump that both resolved on their own without any significant treatment.  He notes that he has had some burning and itching sensation around his anus.  This appears to be more prominent over the past 2 months.  He has intermittently used Preparation H with intermittent benefit.  He has not used this for proxy 1 month.  Examination is benign in the office without significant prolapsing internal hemorrhoidal disease.    In terms of his symptomatology, he has pruritus ani.  We discussed this is an irritated skin condition of the skin around the anus.  This can lead to burning and itching.  I have recommended dietary measures, topical agents such as Calmoseptine, judicious hygiene.  I do not see any surgically treatable pathology such as large internal prolapsing hemorrhoids.  Supporting material was given.  He will return as needed.

## 2024-03-29 ENCOUNTER — OFFICE VISIT (OUTPATIENT)
Dept: FAMILY MEDICINE CLINIC | Facility: CLINIC | Age: 34
End: 2024-03-29
Payer: COMMERCIAL

## 2024-03-29 VITALS
HEART RATE: 70 BPM | TEMPERATURE: 98.2 F | BODY MASS INDEX: 27.13 KG/M2 | RESPIRATION RATE: 18 BRPM | DIASTOLIC BLOOD PRESSURE: 84 MMHG | OXYGEN SATURATION: 99 % | SYSTOLIC BLOOD PRESSURE: 126 MMHG | WEIGHT: 179 LBS | HEIGHT: 68 IN

## 2024-03-29 DIAGNOSIS — G44.209 TENSION HEADACHE: Primary | ICD-10-CM

## 2024-03-29 DIAGNOSIS — D68.9 COAGULOPATHY (HCC): ICD-10-CM

## 2024-03-29 DIAGNOSIS — M54.12 CERVICAL RADICULOPATHY AT C6: ICD-10-CM

## 2024-03-29 DIAGNOSIS — K21.9 GASTROESOPHAGEAL REFLUX DISEASE WITHOUT ESOPHAGITIS: ICD-10-CM

## 2024-03-29 DIAGNOSIS — M54.2 CERVICAL MUSCLE PAIN: ICD-10-CM

## 2024-03-29 DIAGNOSIS — Q25.79 CONGENITAL DILATION OF PULMONARY ARTERY: ICD-10-CM

## 2024-03-29 PROCEDURE — 99214 OFFICE O/P EST MOD 30 MIN: CPT | Performed by: FAMILY MEDICINE

## 2024-03-29 NOTE — PROGRESS NOTES
Assessment/Plan:       Problem List Items Addressed This Visit          Cardiovascular and Mediastinum    Congenital dilation of pulmonary artery     Stable monitor closely with imaging and echocardiography            Digestive    Gastroesophageal reflux disease without esophagitis     GERD symptoms stable if symptoms change or worsen add proton pump inhibitor or H2 blocker follow-up with me as scheduled in June            Nervous and Auditory    Cervical radiculopathy at C6     Check MRI with persistent symptoms of left upper extremity pain and at times weakness.  Symptoms persist and are consistent with C5-6 C6-7 region.         Relevant Orders    MRI cervical spine wo contrast       Hematopoietic and Hemostatic    Coagulopathy (HCC)     Resolved follow-up with laboratory work            Surgery/Wound/Pain    Tension headache - Primary     Patient will continue with magnesium vitamins and as needed Tylenol         Cervical muscle pain     Cervical muscle pain into trapezius left shoulder and anterior pectoralis persistent on left side with radicular symptoms into his left upper extremity at times worsening and traveling into his lower arm and hand following a C5-6 C6-7 distribution              Subjective:      Patient ID: Jarrett Marie is a 33 y.o. male.    Patient presents for evaluation of ongoing neck pain primarily left-sided which radiates from neck into trapezius shoulder and upper anterior chest wall at the pectoralis muscle.  He has been through therapy and tried various home exercises now and came back today for follow-up evaluation prior to his upcoming appointment in June        The following portions of the patient's history were reviewed and updated as appropriate: allergies, current medications, past family history, past medical history, past social history, past surgical history and problem list.    Review of Systems   Constitutional:  Negative for chills, fatigue and fever.   HENT:  Negative  "for congestion, nosebleeds, rhinorrhea, sinus pressure and sore throat.    Eyes:  Negative for discharge and redness.   Respiratory:  Negative for cough and shortness of breath.    Cardiovascular:  Negative for chest pain, palpitations and leg swelling.   Gastrointestinal:  Negative for abdominal pain, blood in stool and nausea.   Endocrine: Negative for cold intolerance, heat intolerance and polyuria.   Genitourinary:  Negative for dysuria and frequency.   Musculoskeletal:  Positive for neck pain. Negative for arthralgias, back pain and myalgias.   Skin:  Negative for rash.   Neurological:  Negative for dizziness, weakness and headaches.   Hematological:  Negative for adenopathy.   Psychiatric/Behavioral:  Negative for behavioral problems and sleep disturbance. The patient is not nervous/anxious.          Objective:      /84 (BP Location: Left arm, Patient Position: Sitting, Cuff Size: Standard)   Pulse 70   Temp 98.2 °F (36.8 °C) (Tympanic)   Resp 18   Ht 5' 8\" (1.727 m)   Wt 81.2 kg (179 lb)   SpO2 99%   BMI 27.22 kg/m²        Physical Exam  Vitals and nursing note reviewed.   Constitutional:       Appearance: Normal appearance. He is well-developed.   HENT:      Head: Normocephalic and atraumatic.      Right Ear: Tympanic membrane and external ear normal.      Left Ear: Tympanic membrane and external ear normal.      Nose: Nose normal.      Mouth/Throat:      Mouth: Mucous membranes are moist.   Eyes:      General: No scleral icterus.     Conjunctiva/sclera: Conjunctivae normal.      Pupils: Pupils are equal, round, and reactive to light.   Neck:      Thyroid: No thyromegaly.      Vascular: No JVD.   Cardiovascular:      Rate and Rhythm: Normal rate and regular rhythm.      Pulses: Normal pulses.      Heart sounds: Normal heart sounds. No murmur heard.  Pulmonary:      Effort: Pulmonary effort is normal.      Breath sounds: Normal breath sounds. No wheezing or rales.   Chest:      Chest wall: No " tenderness.   Abdominal:      General: Bowel sounds are normal. There is no distension.      Palpations: Abdomen is soft. There is no mass.      Tenderness: There is no abdominal tenderness. There is no guarding or rebound.   Musculoskeletal:         General: No tenderness or deformity. Normal range of motion.      Cervical back: Normal range of motion and neck supple.   Lymphadenopathy:      Cervical: No cervical adenopathy.   Skin:     General: Skin is warm and dry.      Findings: No erythema or rash.   Neurological:      General: No focal deficit present.      Mental Status: He is alert and oriented to person, place, and time.      Cranial Nerves: No cranial nerve deficit.      Deep Tendon Reflexes: Reflexes are normal and symmetric. Reflexes normal.   Psychiatric:         Mood and Affect: Mood normal.         Behavior: Behavior normal.         Thought Content: Thought content normal.         Judgment: Judgment normal.          Data:    Laboratory Results: I have personally reviewed the pertinent laboratory results/reports   Radiology/Other Diagnostic Testing Results: I have personally reviewed pertinent reports.       Lab Results   Component Value Date    WBC 4.73 02/15/2023    HGB 13.9 02/15/2023    HCT 43.6 02/15/2023    MCV 87 02/15/2023     02/15/2023     Lab Results   Component Value Date     08/20/2014    K 4.2 02/15/2023     02/15/2023    CO2 26 02/15/2023    ANIONGAP 7 08/20/2014    BUN 14 02/15/2023    CREATININE 0.98 02/15/2023    GLUCOSE 84 08/20/2014    GLUF 91 02/15/2023    CALCIUM 9.4 02/15/2023    AST 40 02/15/2023    ALT 41 02/15/2023    ALKPHOS 59 02/15/2023    PROT 7.5 10/31/2014    BILITOT 1.3 (H) 10/31/2014    EGFR 102 02/15/2023     Lab Results   Component Value Date    CHOLESTEROL 192 02/15/2023    CHOLESTEROL 196 04/12/2022    CHOLESTEROL 228 (H) 11/28/2021     Lab Results   Component Value Date    HDL 59 02/15/2023    HDL 54 04/12/2022    HDL 50 11/28/2021     Lab  "Results   Component Value Date    LDLCALC 115 (H) 02/15/2023    LDLCALC 127 (H) 04/12/2022    LDLCALC 139 (H) 11/28/2021     Lab Results   Component Value Date    TRIG 90 02/15/2023    TRIG 73 04/12/2022    TRIG 193 (H) 11/28/2021     No results found for: \"CHOLHDL\"  Lab Results   Component Value Date    MTH2VAWOCCKQ 1.420 02/15/2023     No results found for: \"HGBA1C\"  No results found for: \"PSA\"    El Currie, DO      "

## 2024-03-29 NOTE — ASSESSMENT & PLAN NOTE
Check MRI with persistent symptoms of left upper extremity pain and at times weakness.  Symptoms persist and are consistent with C5-6 C6-7 region.

## 2024-03-29 NOTE — ASSESSMENT & PLAN NOTE
Cervical muscle pain into trapezius left shoulder and anterior pectoralis persistent on left side with radicular symptoms into his left upper extremity at times worsening and traveling into his lower arm and hand following a C5-6 C6-7 distribution

## 2024-03-29 NOTE — ASSESSMENT & PLAN NOTE
GERD symptoms stable if symptoms change or worsen add proton pump inhibitor or H2 blocker follow-up with me as scheduled in June

## 2024-04-08 ENCOUNTER — HOSPITAL ENCOUNTER (OUTPATIENT)
Dept: MRI IMAGING | Facility: HOSPITAL | Age: 34
Discharge: HOME | End: 2024-04-08
Attending: FAMILY MEDICINE
Payer: COMMERCIAL

## 2024-04-08 DIAGNOSIS — M54.12 CERVICAL RADICULOPATHY AT C6: ICD-10-CM

## 2024-04-08 PROCEDURE — 72141 MRI NECK SPINE W/O DYE: CPT

## 2024-05-08 NOTE — PROGRESS NOTES
Diagnoses and all orders for this visit:    Pruritus ani       Pruritus ani  Patient presents for follow-up of perianal symptoms.  Patient notes that his pruritus overall is improved.  He still notes intermittent pruritus that appears to be worse with different types of pants.  He notes occasional bleeding only with hard bowel movements.  Examination in the office today is largely unremarkable.  We discussed concerning findings should they occur in the future to include warning signs such as progressive bleeding, worsening anorectal pain, more regular symptoms.  At this point time I have recommended topical hygiene and skin protectant as needed for the itching.  I have recommended high-fiber diet with mineral oil as needed for stool softening.  I will see him back as needed in the future.      JULIANA Marie is a 33 y.o. male who presents for a follow up visit for pruritus ani. He was last seen in the office 1/29/24.     The patient states that his itchiness has improved.     The patient also reports occasional rectal bleeding. He states that this has occurred once in the past few weeks. He notices bright red blood upon wiping. The patient states that this usually occurs with bowel movements with harder stool.    He states that he has a bowel movement every 1-2 days.     Past Medical History:   Diagnosis Date    Anxiety 12/14/2021    Chest pain     GERD (gastroesophageal reflux disease)     Resolved 9/23*22     Past Surgical History:   Procedure Laterality Date    DENTAL IMPLANT      EGD      NASAL/SINUS ENDOSCOPY Bilateral 9/28/2022    Procedure: FESS IMAGED GUIDED, BL maxillary sinusotomies, BL total Ethmoidectomies remove polyp, BL frontal exploration remove tissue and polyp;  Surgeon: Bonifacio Medrano DO;  Location: [facility];  Service: ENT       Current Outpatient Medications:     acetaminophen (TYLENOL) 500 mg tablet, Take 1,000 mg by mouth every 6 (six) hours as needed for mild pain, Disp: ,  Rfl:     Calcium Ascorbate (VITAMIN C) 500 mg tablet, Take 500 mg by mouth daily, Disp: , Rfl:     Cholecalciferol (EQL Vitamin D3) 25 MCG (1000 UT) capsule, Take 1,000 Units by mouth daily, Disp: , Rfl:     Magnesium 500 MG CAPS, Take 500 mg by mouth in the morning, Disp: , Rfl:   Allergies as of 05/09/2024    (No Known Allergies)     Review of Systems   All other systems reviewed and are negative.    Vitals:    05/09/24 1329   BP: 110/68     Physical Exam  Constitutional:       Appearance: Normal appearance.   HENT:      Head: Normocephalic and atraumatic.   Eyes:      Extraocular Movements: Extraocular movements intact.      Pupils: Pupils are equal, round, and reactive to light.   Musculoskeletal:         General: Normal range of motion.   Skin:     General: Skin is warm and dry.   Neurological:      General: No focal deficit present.      Mental Status: He is alert and oriented to person, place, and time.   Psychiatric:         Mood and Affect: Mood normal.         Behavior: Behavior normal.         Thought Content: Thought content normal.         Judgment: Judgment normal.

## 2024-05-09 ENCOUNTER — OFFICE VISIT (OUTPATIENT)
Age: 34
End: 2024-05-09
Payer: COMMERCIAL

## 2024-05-09 VITALS
HEIGHT: 68 IN | SYSTOLIC BLOOD PRESSURE: 110 MMHG | BODY MASS INDEX: 26.67 KG/M2 | WEIGHT: 176 LBS | DIASTOLIC BLOOD PRESSURE: 68 MMHG

## 2024-05-09 DIAGNOSIS — L29.0 PRURITUS ANI: Primary | ICD-10-CM

## 2024-05-09 PROCEDURE — 99213 OFFICE O/P EST LOW 20 MIN: CPT | Performed by: COLON & RECTAL SURGERY

## 2024-05-09 NOTE — ASSESSMENT & PLAN NOTE
Patient presents for follow-up of perianal symptoms.  Patient notes that his pruritus overall is improved.  He still notes intermittent pruritus that appears to be worse with different types of pants.  He notes occasional bleeding only with hard bowel movements.  Examination in the office today is largely unremarkable.  We discussed concerning findings should they occur in the future to include warning signs such as progressive bleeding, worsening anorectal pain, more regular symptoms.  At this point time I have recommended topical hygiene and skin protectant as needed for the itching.  I have recommended high-fiber diet with mineral oil as needed for stool softening.  I will see him back as needed in the future.

## 2024-11-06 ENCOUNTER — OFFICE VISIT (OUTPATIENT)
Dept: FAMILY MEDICINE CLINIC | Facility: CLINIC | Age: 34
End: 2024-11-06
Payer: COMMERCIAL

## 2024-11-06 VITALS
DIASTOLIC BLOOD PRESSURE: 62 MMHG | HEART RATE: 77 BPM | SYSTOLIC BLOOD PRESSURE: 110 MMHG | TEMPERATURE: 98.2 F | BODY MASS INDEX: 26.8 KG/M2 | RESPIRATION RATE: 18 BRPM | WEIGHT: 176.8 LBS | HEIGHT: 68 IN | OXYGEN SATURATION: 98 %

## 2024-11-06 DIAGNOSIS — G57.02 PIRIFORMIS SYNDROME OF LEFT SIDE: ICD-10-CM

## 2024-11-06 DIAGNOSIS — M54.16 ACUTE LEFT LUMBAR RADICULOPATHY: Primary | ICD-10-CM

## 2024-11-06 PROCEDURE — 99395 PREV VISIT EST AGE 18-39: CPT | Performed by: FAMILY MEDICINE

## 2024-11-06 NOTE — PROGRESS NOTES
Adult Annual Physical  Name: Jarrett Marie      : 1990      MRN: [id number]  Encounter Provider: El Currie DO  Encounter Date: 2024   Encounter department: [facility]    Assessment & Plan  Acute left lumbar radiculopathy  Patient notes worsening symptoms with running.  He does sit a lot for his job but he likes to exercise.  He may have more tightness in both the gluteals and the piriformis muscle along with lower back tightness and overdeveloped hamstrings and glutes.  Also thigh strength is good.  I would like him to go for physical therapy to work on these areas with stretching.  And provide exercises so he can continue with his exercise program avoid running on uneven surfaces avoid sprinting try walking for the next week or 2 until this starts to settle down    Orders:    Ambulatory Referral to Physical Therapy; Future    Piriformis syndrome of left side  Recommend physical therapy for evaluation and treatment    Orders:    Ambulatory Referral to Physical Therapy; Future    Immunizations and preventive care screenings were discussed with patient today. Appropriate education was printed on patient's after visit summary.    Counseling:  Alcohol/drug use: discussed moderation in alcohol intake, the recommendations for healthy alcohol use, and avoidance of illicit drug use.  Dental Health: discussed importance of regular tooth brushing, flossing, and dental visits.  Injury prevention: discussed safety/seat belts, safety helmets, smoke detectors, carbon monoxide detectors, and smoking near bedding or upholstery.  Sexual health: discussed sexually transmitted diseases, partner selection, use of condoms, avoidance of unintended pregnancy, and contraceptive alternatives.  Exercise: the importance of regular exercise/physical activity was discussed. Recommend exercise 3-5 times per week for at least 30 minutes.          History of Present Illness     Adult Annual  "Physical:  Patient presents for annual physical.     Diet and Physical Activity:  - Diet/Nutrition: well balanced diet.  - Exercise: no formal exercise, walking, 3-4 times a week on average and moderate cardiovascular exercise.    Depression Screening:  - PHQ-2 Score: 0    General Health:  - Sleep: sleeps well.  - Hearing: normal hearing right ear and normal hearing bilateral ears.  - Vision: no vision problems.  - Dental: regular dental visits.     Health:    - Urinary symptoms: none.     Review of Systems   Constitutional:  Negative for chills, fatigue and fever.   HENT:  Negative for congestion, nosebleeds, rhinorrhea, sinus pressure and sore throat.    Eyes:  Negative for discharge and redness.   Respiratory:  Negative for cough and shortness of breath.    Cardiovascular:  Negative for chest pain, palpitations and leg swelling.   Gastrointestinal:  Negative for abdominal pain, blood in stool and nausea.   Endocrine: Negative for cold intolerance, heat intolerance and polyuria.   Genitourinary:  Negative for dysuria and frequency.   Musculoskeletal:  Negative for arthralgias, back pain and myalgias.   Skin:  Negative for rash.   Neurological:  Negative for dizziness, weakness and headaches.   Hematological:  Negative for adenopathy.   Psychiatric/Behavioral:  Negative for behavioral problems and sleep disturbance. The patient is not nervous/anxious.          Objective     /62 (BP Location: Left arm, Patient Position: Sitting, Cuff Size: Standard)   Pulse 77   Temp 98.2 °F (36.8 °C) (Tympanic)   Resp 18   Ht 5' 8\" (1.727 m)   Wt 80.2 kg (176 lb 12.8 oz)   SpO2 98%   BMI 26.88 kg/m²     Physical Exam  Vitals and nursing note reviewed.   Constitutional:       Appearance: Normal appearance. He is well-developed.   HENT:      Head: Normocephalic and atraumatic.      Right Ear: External ear normal.      Left Ear: External ear normal.      Nose: Nose normal.   Eyes:      General: No scleral icterus.     " Conjunctiva/sclera: Conjunctivae normal.      Pupils: Pupils are equal, round, and reactive to light.   Neck:      Thyroid: No thyromegaly.      Vascular: No JVD.   Cardiovascular:      Rate and Rhythm: Normal rate and regular rhythm.      Heart sounds: Normal heart sounds. No murmur heard.  Pulmonary:      Effort: Pulmonary effort is normal.      Breath sounds: Normal breath sounds. No wheezing or rales.   Chest:      Chest wall: No tenderness.   Abdominal:      General: Bowel sounds are normal. There is no distension.      Palpations: Abdomen is soft. There is no mass.      Tenderness: There is no abdominal tenderness. There is no guarding or rebound.   Musculoskeletal:         General: Tenderness present. No deformity. Normal range of motion.      Cervical back: Normal range of motion and neck supple.   Lymphadenopathy:      Cervical: No cervical adenopathy.   Skin:     General: Skin is warm and dry.      Findings: No erythema or rash.   Neurological:      Mental Status: He is alert and oriented to person, place, and time.      Cranial Nerves: No cranial nerve deficit.      Deep Tendon Reflexes: Reflexes are normal and symmetric. Reflexes normal.   Psychiatric:         Behavior: Behavior normal.         Thought Content: Thought content normal.         Judgment: Judgment normal.

## 2024-11-06 NOTE — ASSESSMENT & PLAN NOTE
Recommend physical therapy for evaluation and treatment    Orders:    Ambulatory Referral to Physical Therapy; Future

## 2024-11-06 NOTE — ASSESSMENT & PLAN NOTE
Patient notes worsening symptoms with running.  He does sit a lot for his job but he likes to exercise.  He may have more tightness in both the gluteals and the piriformis muscle along with lower back tightness and overdeveloped hamstrings and glutes.  Also thigh strength is good.  I would like him to go for physical therapy to work on these areas with stretching.  And provide exercises so he can continue with his exercise program avoid running on uneven surfaces avoid sprinting try walking for the next week or 2 until this starts to settle down    Orders:    Ambulatory Referral to Physical Therapy; Future

## 2024-12-18 ENCOUNTER — EVALUATION (OUTPATIENT)
Dept: PHYSICAL THERAPY | Facility: CLINIC | Age: 34
End: 2024-12-18
Payer: COMMERCIAL

## 2024-12-18 DIAGNOSIS — M54.16 ACUTE LEFT LUMBAR RADICULOPATHY: ICD-10-CM

## 2024-12-18 DIAGNOSIS — G57.02 PIRIFORMIS SYNDROME OF LEFT SIDE: ICD-10-CM

## 2024-12-18 PROCEDURE — 97162 PT EVAL MOD COMPLEX 30 MIN: CPT | Performed by: PHYSICAL THERAPIST

## 2024-12-18 PROCEDURE — 97110 THERAPEUTIC EXERCISES: CPT | Performed by: PHYSICAL THERAPIST

## 2024-12-18 NOTE — PROGRESS NOTES
PT Evaluation     Today's date: 2024  Patient name: Jarrett Marie  : 1990  MRN: [id number]  Referring provider: El Currie DO  Dx:   Encounter Diagnosis     ICD-10-CM    1. Acute left lumbar radiculopathy  M54.16 Ambulatory Referral to Physical Therapy      2. Piriformis syndrome of left side  G57.02 Ambulatory Referral to Physical Therapy                     Assessment  Impairments: abnormal coordination, abnormal muscle firing, abnormal or restricted ROM, abnormal movement, activity intolerance, impaired physical strength, lacks appropriate home exercise program, pain with function, safety issue, weight-bearing intolerance and poor posture   Functional limitations: difficulty with recreational activites, Running  Symptom irritability: moderate    Assessment details: Jarrett Marie is a 34 y.o. male who presents to outpatient PT with a  Acute left lumbar radiculopathy  Piriformis syndrome of left side.  No further referral appears necessary at this time based upon examination results. Pt presents with decreased strength, ROM, balance, functional activity tolerance, and pain with movement in his Left hip/lumbar spine  which is  limiting his ability to perform the aforementioned functional activities.  Etiologic factors include repetitive poor body mechanics. Prognosis is good given HEP compliance and PT 2-3/wk.  Please contact me if you have any questions or recommendations.  Thank you for the opportunity to share in  [facility].     Understanding of Dx/Px/POC: good     Prognosis: good    Goals  STG to be achieved in 4 weeks     1. Pt will reduce subjective pain rating by at least 50 percent the help facilitate return to PLOF  2. Pt will improve B/L MMT scores by at least 1/2 grade to promote improved functional activity tolerance      LTG to be achieved in 6-8 weeks   1. Pt will be complaint with HEP   2. Pt will improve ROM to WFL, to help facilitate independence with ADL's, IADL's,  and functional activities   3. Pt will improve Strength to WFL to help facilitate independence with ADL's, IADL's, and functional activities   4. Pt will have no limitations with ambulation to help facilitate independence at home and in the community.   5. Pt will have no limitations with stair negotiation to help facilitate independence at home and in the community           Plan  Patient would benefit from: skilled physical therapy    Planned therapy interventions: ADL training, balance, balance/weight bearing training, flexibility, functional ROM exercises, gait training, graded activity, graded exercise, graded motor, home exercise program, joint mobilization, manual therapy, neuromuscular re-education, patient education, postural training, strengthening, stretching, therapeutic activities and therapeutic exercise    Frequency: 2x week  Duration in weeks: 12  Plan of Care beginning date: 2024  Plan of Care expiration date: 3/17/2025  Treatment plan discussed with: patient, PTA and referring physician      Subjective Evaluation    History of Present Illness  Mechanism of injury: 34 year old male presents to outpatient PT with CC of LLE pain for the past few months. Pt reports he is active and runs/ sprints. Notes new onset of LLE pain with occasional radiculopathy, Insidious onset. Reports no aggravating factors. Pain is localized to the left buttocks area, and will eventually radiate into the LLE. Uses biofreeze as needed. He has changes his workout routine d/t pain. Goals: are to decrease soreness in the LLE and buttocks.  Patient Goals  Patient goals for therapy: increased strength, decreased pain and increased motion  Patient goal: to decrease pain.  Pain  Current pain ratin  At best pain ratin  At worst pain ratin  Quality: dull ache, burning and sharp  Relieving factors: change in position, rest, relaxation, support and medications  Progression: no change        Objective     Concurrent  Complaints  Positive for disturbed sleep. Negative for night pain, bladder dysfunction, bowel dysfunction and saddle (S4) numbness    Active Range of Motion   Left Hip   Flexion: WFL  Abduction: WFL  Adduction: WFL    Right Hip   Flexion: WFL  Abduction: WFL  Adduction: WFL  Left Knee   Flexion: WFL  Extension: WFL    Right Knee   Flexion: WFL  Extension: WFL  Left Ankle/Foot   Dorsiflexion (kf): WFL  Plantar flexion: WFL    Right Ankle/Foot   Dorsiflexion (kf): WFL  Plantar flexion: WFL  Mechanical Assessment    Cervical      Thoracic      Lumbar    Standing flexion: repeated movements   Pain location:no change  Standing extension: repeated movements  Pain location: no change    Strength/Myotome Testing     Left Hip   Planes of Motion   Flexion: 4-  Abduction: 4-  Adduction: 4-    Right Hip   Planes of Motion   Flexion: 4-  Abduction: 4-  Adduction: 4-    Left Knee   Flexion: 4-  Extension: 4-    Right Knee   Flexion: 4-  Extension: 4-    Left Ankle/Foot   Dorsiflexion: 4-  Plantar flexion: 4-    Right Ankle/Foot   Dorsiflexion: 4-  Plantar flexion: 4-    Tests     Lumbar     Left   Negative passive SLR.     Right   Negative passive SLR.     Left Hip   Positive long sit.     Right Hip   Positive long sit.     Additional Tests Details  Left anteriorly rotated innominate MET to correct for rotation.              Precautions:       Manuals                                                                 Neuro Re-Ed             PPT             Pball abs              Hip add             Hip abd              Bridge             SLR flex abd                                                                  Ther Ex             Leg press              Leg ext              Leg curl             HR TR              Mini Squat             Step up              Three way              MET to correct for Anteriorly rotated SIJ  Performed             Ther Activity                                       Gait Training                                        Modalities

## 2024-12-23 ENCOUNTER — OFFICE VISIT (OUTPATIENT)
Dept: PHYSICAL THERAPY | Facility: CLINIC | Age: 34
End: 2024-12-23
Payer: COMMERCIAL

## 2024-12-23 DIAGNOSIS — M54.16 ACUTE LEFT LUMBAR RADICULOPATHY: Primary | ICD-10-CM

## 2024-12-23 DIAGNOSIS — G57.02 PIRIFORMIS SYNDROME OF LEFT SIDE: ICD-10-CM

## 2024-12-23 PROCEDURE — 97112 NEUROMUSCULAR REEDUCATION: CPT

## 2024-12-23 PROCEDURE — 97110 THERAPEUTIC EXERCISES: CPT

## 2024-12-23 NOTE — PROGRESS NOTES
"Daily Note     Today's date: 2024  Patient name: Jarrett Marie  : 1990  MRN: [id number]  Referring provider: El Currie DO  Dx:   Encounter Diagnosis     ICD-10-CM    1. Acute left lumbar radiculopathy  M54.16       2. Piriformis syndrome of left side  G57.02                      Subjective: Pt denies significant pain at present.       Objective: See treatment diary below      Assessment: Tolerated treatment well. Initiated exercises as outlined in POC.Patient demonstrated fatigue post treatment, exhibited good technique with therapeutic exercises, and would benefit from continued PT      Plan: Continue per plan of care.      Precautions:       Manuals                                                                 Neuro Re-Ed             PPT  5\" x20           Pball abs   5\" x20           Hip add  5\" x20           Hip abd   GTB  5\" x20           Bridge  5\" x20           SLR flex abd   2x10                                                                Ther Ex             Upright bike  L 3 x10 min           Leg press              Leg ext              Leg curl             HR TR   x20           Mini Squat  X10            Step up              Three way              MET to correct for Anteriorly rotated SIJ  Performed  Not needed           Ther Activity                                       Gait Training                                       Modalities                                            "

## 2024-12-24 ENCOUNTER — APPOINTMENT (OUTPATIENT)
Dept: PHYSICAL THERAPY | Facility: CLINIC | Age: 34
End: 2024-12-24
Payer: COMMERCIAL

## 2024-12-30 ENCOUNTER — OFFICE VISIT (OUTPATIENT)
Dept: PHYSICAL THERAPY | Facility: CLINIC | Age: 34
End: 2024-12-30
Payer: COMMERCIAL

## 2024-12-30 DIAGNOSIS — G57.02 PIRIFORMIS SYNDROME OF LEFT SIDE: ICD-10-CM

## 2024-12-30 DIAGNOSIS — M54.16 ACUTE LEFT LUMBAR RADICULOPATHY: Primary | ICD-10-CM

## 2024-12-30 PROCEDURE — 97110 THERAPEUTIC EXERCISES: CPT

## 2024-12-30 PROCEDURE — 97112 NEUROMUSCULAR REEDUCATION: CPT

## 2024-12-30 NOTE — PROGRESS NOTES
"Daily Note     Today's date: 2024  Patient name: Jarrett Marie  : 1990  MRN: [id number]  Referring provider: El Currie DO  Dx:   Encounter Diagnosis     ICD-10-CM    1. Acute left lumbar radiculopathy  M54.16       2. Piriformis syndrome of left side  G57.02                      Subjective: Patient noted no new complaints.       Objective: See treatment diary below      Assessment: Tolerated treatment fair. Patient exhibited good technique with therapeutic exercises and would benefit from continued PT. Patient was able to perform with appropriate level of challenge. VC for correct dosage of exercises.       Plan: Continue per plan of care.      Precautions:       Manuals                                                                Neuro Re-Ed             PPT  5\" x20 5'' x 20          Pball abs   5\" x20 5\" x 20          Hip add  5\" x20 5\" x 20          Hip abd   GTB  5\" x20 GTB 5\" x 20          Bridge  5\" x20 5'' x 20           SLR flex abd   2x10  2 x 10                                                              Ther Ex             Upright bike  L 3 x10 min L 3 x10 min          Leg press              Leg ext              Leg curl             HR TR   x20 20 x           Mini Squat  X10  10 x           Step up              Three way              MET to correct for Anteriorly rotated SIJ  Performed  Not needed Not needed          Ther Activity                                       Gait Training                                       Modalities                                              "

## 2025-05-09 ENCOUNTER — TELEPHONE (OUTPATIENT)
Age: 35
End: 2025-05-09

## 2025-05-09 ENCOUNTER — OFFICE VISIT (OUTPATIENT)
Dept: URGENT CARE | Facility: CLINIC | Age: 35
End: 2025-05-09
Payer: COMMERCIAL

## 2025-05-09 VITALS
BODY MASS INDEX: 27.47 KG/M2 | RESPIRATION RATE: 18 BRPM | SYSTOLIC BLOOD PRESSURE: 106 MMHG | WEIGHT: 175 LBS | HEART RATE: 63 BPM | HEIGHT: 67 IN | DIASTOLIC BLOOD PRESSURE: 66 MMHG | OXYGEN SATURATION: 99 % | TEMPERATURE: 97 F

## 2025-05-09 DIAGNOSIS — R00.0 RAPID HEART BEAT: Primary | ICD-10-CM

## 2025-05-09 LAB
ATRIAL RATE: 63 BPM
P AXIS: 57 DEGREES
PR INTERVAL: 210 MS
QRS AXIS: 51 DEGREES
QRSD INTERVAL: 88 MS
QT INTERVAL: 422 MS
QTC INTERVAL: 432 MS
T WAVE AXIS: 21 DEGREES
VENTRICULAR RATE: 63 BPM

## 2025-05-09 PROCEDURE — 99214 OFFICE O/P EST MOD 30 MIN: CPT | Performed by: PHYSICAL MEDICINE & REHABILITATION

## 2025-05-09 PROCEDURE — 93005 ELECTROCARDIOGRAM TRACING: CPT | Performed by: PHYSICAL MEDICINE & REHABILITATION

## 2025-05-09 PROCEDURE — 93010 ELECTROCARDIOGRAM REPORT: CPT | Performed by: INTERNAL MEDICINE

## 2025-05-09 NOTE — PROGRESS NOTES
[facility]        NAME: Jarrett Marie is a 34 y.o. male  : 1990    MRN: [id number]  DATE: May 9, 2025  TIME: 9:46 AM    Assessment and Plan   Rapid heart beat [R00.0]  1. Rapid heart beat          EKG performed with sinus rhythm, noted RBBB, 1st degree AV block  Compared to previous study, EKG performed today stable in comparison to previous in    Patient has had previous stress test in . Does not appear to of had further work up  Discussed importance of annual lab, any alcohol/smoking cessation   Patient did not eat yet today and notes feeling shaky. Did advised to get some food and continue to drink water today  Patient does not have chest pain, shortness of breath and vitals appear stable at this time. If symptoms worsen or persists did discuss ER follow up.  Otherwise, patient ok to follow up with PCP for annual blood work as indicated. If symptoms persists can consider Cardiology referral for further work up.    Patient Instructions       Follow up with PCP in 3-5 days.  Proceed to  ER if symptoms worsen.    If tests are performed, our office will contact you with results only if changes need to made to the care plan discussed with you at the visit. You can review your full results on [facility].    Chief Complaint     Chief Complaint   Patient presents with    Rapid Heart Rate     Fast heart rate since 0300         History of Present Illness       Patient is a 34 year old male presenting with racing heart beat that started at approximately 0300 today, 25, and sweating. The symptoms have since started resolving. He does admit to feeling shakiness and tingling in fingers/feet since symptoms started. Patient denies chest pain, shortness of breath. Patient denies this sensation in the past. However, review of chart shows similar incident of feeling palpitations back in 2021. EKG at that time reveled noncomplete RBBB and 1st degree AV block. Patient does admit he did  take supplements for going to the gym and he did go for a run yesterday, 5/8/25 but symptoms did not start until the middle of the night, early this morning.  He did not eat anything yet today but has been drinking water. Otherwise denies any cold-like symptoms, weakness, neurological deficits aside from tingling sensation.     Rapid Heart Rate         Review of Systems   Review of Systems   Constitutional: Negative.    HENT: Negative.     Respiratory: Negative.     Cardiovascular:  Positive for palpitations.   Gastrointestinal: Negative.    Musculoskeletal: Negative.    Neurological: Negative.          Current Medications       Current Outpatient Medications:     acetaminophen (TYLENOL) 500 mg tablet, Take 1,000 mg by mouth every 6 (six) hours as needed for mild pain (Patient not taking: Reported on 5/9/2025), Disp: , Rfl:     Calcium Ascorbate (VITAMIN C) 500 mg tablet, Take 500 mg by mouth daily (Patient not taking: Reported on 5/9/2025), Disp: , Rfl:     Cholecalciferol (EQL Vitamin D3) 25 MCG (1000 UT) capsule, Take 1,000 Units by mouth daily (Patient not taking: Reported on 5/9/2025), Disp: , Rfl:     Magnesium 500 MG CAPS, Take 500 mg by mouth in the morning (Patient not taking: Reported on 5/9/2025), Disp: , Rfl:     Current Allergies     Allergies as of 05/09/2025    (No Known Allergies)            The following portions of the patient's history were reviewed and updated as appropriate: allergies, current medications, past family history, past medical history, past social history, past surgical history and problem list.     Past Medical History:   Diagnosis Date    Anxiety 12/14/2021    Chest pain     GERD (gastroesophageal reflux disease)     Resolved 9/23*22       Past Surgical History:   Procedure Laterality Date    DENTAL IMPLANT      EGD      NASAL/SINUS ENDOSCOPY Bilateral 9/28/2022    Procedure: FESS IMAGED GUIDED, BL maxillary sinusotomies, BL total Ethmoidectomies remove polyp, BL frontal  "exploration remove tissue and polyp;  Surgeon: Bonifacio Medrano DO;  Location: [facility];  Service: ENT       Family History   Problem Relation Age of Onset    Thyroid disease Mother     No Known Problems Father          Medications have been verified.        Objective   /66   Pulse 63   Temp (!) 97 °F (36.1 °C) (Temporal)   Resp 18   Ht 5' 7\" (1.702 m)   Wt 79.4 kg (175 lb)   SpO2 99%   BMI 27.41 kg/m²        Physical Exam     Physical Exam  Vitals reviewed.   Constitutional:       General: He is not in acute distress.     Appearance: Normal appearance. He is not ill-appearing.   HENT:      Right Ear: Tympanic membrane normal.      Left Ear: Tympanic membrane normal.      Nose: Nose normal.      Mouth/Throat:      Mouth: Mucous membranes are moist.      Pharynx: Oropharynx is clear.   Eyes:      Extraocular Movements: Extraocular movements intact.      Conjunctiva/sclera: Conjunctivae normal.      Pupils: Pupils are equal, round, and reactive to light.   Neck:      Vascular: No carotid bruit.   Cardiovascular:      Rate and Rhythm: Normal rate and regular rhythm.      Pulses: Normal pulses.      Heart sounds: Normal heart sounds. No murmur heard.     No friction rub. No gallop.   Pulmonary:      Effort: Pulmonary effort is normal. No respiratory distress.      Breath sounds: Normal breath sounds. No wheezing, rhonchi or rales.   Musculoskeletal:      Cervical back: Normal range of motion. No tenderness.   Neurological:      General: No focal deficit present.      Mental Status: He is alert and oriented to person, place, and time.      Sensory: No sensory deficit.      Motor: No weakness.      Coordination: Coordination normal.      Gait: Gait normal.   Psychiatric:         Mood and Affect: Mood normal.         Behavior: Behavior normal.                   "

## 2025-05-12 DIAGNOSIS — Z13.29 SCREENING FOR THYROID DISORDER: Primary | ICD-10-CM

## 2025-05-12 DIAGNOSIS — Z13.220 SCREENING FOR LIPID DISORDERS: ICD-10-CM

## 2025-05-12 DIAGNOSIS — Z13.228 SCREENING FOR METABOLIC DISORDER: ICD-10-CM

## 2025-05-12 DIAGNOSIS — Z00.00 ROUTINE MEDICAL EXAM: ICD-10-CM

## 2025-05-12 DIAGNOSIS — Z13.0 SCREENING FOR BLOOD DISEASE: ICD-10-CM

## 2025-05-21 ENCOUNTER — APPOINTMENT (OUTPATIENT)
Age: 35
End: 2025-05-21
Payer: COMMERCIAL

## 2025-05-21 DIAGNOSIS — Z00.00 ROUTINE MEDICAL EXAM: ICD-10-CM

## 2025-05-21 DIAGNOSIS — Z13.29 SCREENING FOR THYROID DISORDER: ICD-10-CM

## 2025-05-21 DIAGNOSIS — Z13.220 SCREENING FOR LIPID DISORDERS: ICD-10-CM

## 2025-05-21 DIAGNOSIS — Z13.228 SCREENING FOR METABOLIC DISORDER: ICD-10-CM

## 2025-05-21 LAB
ALBUMIN SERPL BCG-MCNC: 4.6 G/DL (ref 3.5–5)
ALP SERPL-CCNC: 58 U/L (ref 34–104)
ALT SERPL W P-5'-P-CCNC: 14 U/L (ref 7–52)
ANION GAP SERPL CALCULATED.3IONS-SCNC: 7 MMOL/L (ref 4–13)
AST SERPL W P-5'-P-CCNC: 15 U/L (ref 13–39)
BASOPHILS # BLD AUTO: 0.07 THOUSANDS/ÂΜL (ref 0–0.1)
BASOPHILS NFR BLD AUTO: 1 % (ref 0–1)
BILIRUB SERPL-MCNC: 1.56 MG/DL (ref 0.2–1)
BUN SERPL-MCNC: 13 MG/DL (ref 5–25)
CALCIUM SERPL-MCNC: 9.4 MG/DL (ref 8.4–10.2)
CHLORIDE SERPL-SCNC: 103 MMOL/L (ref 96–108)
CHOLEST SERPL-MCNC: 199 MG/DL (ref ?–200)
CO2 SERPL-SCNC: 30 MMOL/L (ref 21–32)
CREAT SERPL-MCNC: 0.93 MG/DL (ref 0.6–1.3)
EOSINOPHIL # BLD AUTO: 0.26 THOUSAND/ÂΜL (ref 0–0.61)
EOSINOPHIL NFR BLD AUTO: 4 % (ref 0–6)
ERYTHROCYTE [DISTWIDTH] IN BLOOD BY AUTOMATED COUNT: 13.2 % (ref 11.6–15.1)
GFR SERPL CREATININE-BSD FRML MDRD: 107 ML/MIN/1.73SQ M
GLUCOSE P FAST SERPL-MCNC: 91 MG/DL (ref 65–99)
HCT VFR BLD AUTO: 41.2 % (ref 36.5–49.3)
HDLC SERPL-MCNC: 52 MG/DL
HGB BLD-MCNC: 13.8 G/DL (ref 12–17)
IMM GRANULOCYTES # BLD AUTO: 0 THOUSAND/UL (ref 0–0.2)
IMM GRANULOCYTES NFR BLD AUTO: 0 % (ref 0–2)
LDLC SERPL CALC-MCNC: 127 MG/DL (ref 0–100)
LYMPHOCYTES # BLD AUTO: 2.06 THOUSANDS/ÂΜL (ref 0.6–4.47)
LYMPHOCYTES NFR BLD AUTO: 34 % (ref 14–44)
MCH RBC QN AUTO: 28.8 PG (ref 26.8–34.3)
MCHC RBC AUTO-ENTMCNC: 33.5 G/DL (ref 31.4–37.4)
MCV RBC AUTO: 86 FL (ref 82–98)
MONOCYTES # BLD AUTO: 0.56 THOUSAND/ÂΜL (ref 0.17–1.22)
MONOCYTES NFR BLD AUTO: 9 % (ref 4–12)
NEUTROPHILS # BLD AUTO: 3.09 THOUSANDS/ÂΜL (ref 1.85–7.62)
NEUTS SEG NFR BLD AUTO: 52 % (ref 43–75)
NRBC BLD AUTO-RTO: 0 /100 WBCS
PLATELET # BLD AUTO: 265 THOUSANDS/UL (ref 149–390)
PMV BLD AUTO: 9.8 FL (ref 8.9–12.7)
POTASSIUM SERPL-SCNC: 4.1 MMOL/L (ref 3.5–5.3)
PROT SERPL-MCNC: 7 G/DL (ref 6.4–8.4)
RBC # BLD AUTO: 4.79 MILLION/UL (ref 3.88–5.62)
SODIUM SERPL-SCNC: 140 MMOL/L (ref 135–147)
TRIGL SERPL-MCNC: 102 MG/DL (ref ?–150)
TSH SERPL DL<=0.05 MIU/L-ACNC: 1.76 UIU/ML (ref 0.45–4.5)
WBC # BLD AUTO: 6.04 THOUSAND/UL (ref 4.31–10.16)

## 2025-05-21 PROCEDURE — 85025 COMPLETE CBC W/AUTO DIFF WBC: CPT

## 2025-05-21 PROCEDURE — 84443 ASSAY THYROID STIM HORMONE: CPT

## 2025-05-21 PROCEDURE — 36415 COLL VENOUS BLD VENIPUNCTURE: CPT

## 2025-05-21 PROCEDURE — 80061 LIPID PANEL: CPT

## 2025-05-21 PROCEDURE — 80053 COMPREHEN METABOLIC PANEL: CPT

## 2025-05-28 ENCOUNTER — OFFICE VISIT (OUTPATIENT)
Dept: FAMILY MEDICINE CLINIC | Facility: CLINIC | Age: 35
End: 2025-05-28
Payer: COMMERCIAL

## 2025-05-28 VITALS
BODY MASS INDEX: 27.81 KG/M2 | WEIGHT: 177.2 LBS | HEIGHT: 67 IN | OXYGEN SATURATION: 100 % | RESPIRATION RATE: 18 BRPM | HEART RATE: 60 BPM | TEMPERATURE: 96.7 F | SYSTOLIC BLOOD PRESSURE: 112 MMHG | DIASTOLIC BLOOD PRESSURE: 80 MMHG

## 2025-05-28 DIAGNOSIS — D68.9 COAGULOPATHY (HCC): ICD-10-CM

## 2025-05-28 DIAGNOSIS — Q25.79 CONGENITAL DILATION OF PULMONARY ARTERY: ICD-10-CM

## 2025-05-28 DIAGNOSIS — F43.29 ADJUSTMENT DISORDER WITH EMOTIONAL DISTURBANCE: ICD-10-CM

## 2025-05-28 DIAGNOSIS — I44.1 MOBITZ TYPE 1 SECOND DEGREE AV BLOCK: Primary | ICD-10-CM

## 2025-05-28 DIAGNOSIS — Z00.00 ANNUAL PHYSICAL EXAM: ICD-10-CM

## 2025-05-28 DIAGNOSIS — K21.9 GASTROESOPHAGEAL REFLUX DISEASE WITHOUT ESOPHAGITIS: ICD-10-CM

## 2025-05-28 DIAGNOSIS — E78.2 MIXED HYPERLIPIDEMIA: ICD-10-CM

## 2025-05-28 DIAGNOSIS — N52.8 OTHER MALE ERECTILE DYSFUNCTION: ICD-10-CM

## 2025-05-28 PROCEDURE — 99214 OFFICE O/P EST MOD 30 MIN: CPT | Performed by: FAMILY MEDICINE

## 2025-05-28 RX ORDER — TADALAFIL 10 MG/1
10 TABLET ORAL DAILY PRN
Qty: 20 TABLET | Refills: 5 | Status: SHIPPED | OUTPATIENT
Start: 2025-05-28

## 2025-05-28 NOTE — ASSESSMENT & PLAN NOTE
Total cholesterol 199 and LDL cholesterol greater than 100 work on diet and exercise and follow-up with laboratory work annually

## 2025-05-28 NOTE — ASSESSMENT & PLAN NOTE
Discussed with patient at this time and will provide Cialis 10 mg tablets. He notes performance anxiety.    Orders:    tadalafil (CIALIS) 10 MG tablet; Take 1 tablet (10 mg total) by mouth daily as needed for erectile dysfunction

## 2025-05-28 NOTE — ASSESSMENT & PLAN NOTE
Stable currently patient doing well denies palpitations shortness of breath or chest pain remains physically active

## 2025-05-28 NOTE — PROGRESS NOTES
Name: Jarrett Marie      : 1990      MRN: [id number]  Encounter Provider: El Currie DO  Encounter Date: 2025   Encounter department: [facility]  :  Assessment & Plan  Mobitz type 1 second degree AV block  Stable currently patient doing well denies palpitations shortness of breath or chest pain remains physically active         Gastroesophageal reflux disease without esophagitis  No acid reflux or GERD symptoms currently follow-up in 6 months         Coagulopathy (HCC)  Reviewed all laboratory work CBC within normal range reevaluate laboratory work annually         Mixed hyperlipidemia  Total cholesterol 199 and LDL cholesterol greater than 100 work on diet and exercise and follow-up with laboratory work annually         Annual physical exam    Orders:    Vitamin B12; Future    Vitamin D 25 hydroxy; Future    Vitamin B1, whole blood; Future    Folate; Future    Congenital dilation of pulmonary artery  Stable and monitor BP and re evaluate next ov.         Adjustment disorder with emotional disturbance  Discussed with patient regarding job change and adjustment to this creating some issues with his overall status and emotions.  He is coping and not requesting medication but trying to go through this on his own without need for therapy and counseling and patient is understanding of his overall situation.         Other male erectile dysfunction  Discussed with patient at this time and will provide Cialis 10 mg tablets. He notes performance anxiety.    Orders:    tadalafil (CIALIS) 10 MG tablet; Take 1 tablet (10 mg total) by mouth daily as needed for erectile dysfunction           History of Present Illness   Follow-up evaluation review lab work discuss change of occupation and mood      Review of Systems   Constitutional:  Negative for chills, fatigue and fever.   HENT:  Negative for congestion, nosebleeds, rhinorrhea, sinus pressure and sore throat.    Eyes:   "Negative for discharge and redness.   Respiratory:  Negative for cough and shortness of breath.    Cardiovascular:  Negative for chest pain, palpitations and leg swelling.   Gastrointestinal:  Negative for abdominal pain, blood in stool and nausea.   Endocrine: Negative for cold intolerance, heat intolerance and polyuria.   Genitourinary:  Negative for dysuria and frequency.   Musculoskeletal:  Negative for arthralgias, back pain and myalgias.   Skin:  Negative for rash.   Neurological:  Negative for dizziness, weakness and headaches.   Hematological:  Negative for adenopathy.   Psychiatric/Behavioral:  Negative for behavioral problems and sleep disturbance. The patient is not nervous/anxious.        Objective   /80 (BP Location: Left arm, Patient Position: Sitting, Cuff Size: Standard)   Pulse 60   Temp (!) 96.7 °F (35.9 °C) (Tympanic)   Resp 18   Ht 5' 7\" (1.702 m)   Wt 80.4 kg (177 lb 3.2 oz)   SpO2 100%   BMI 27.75 kg/m²      Physical Exam  Vitals and nursing note reviewed.   Constitutional:       Appearance: Normal appearance. He is well-developed.   HENT:      Head: Normocephalic and atraumatic.      Right Ear: Tympanic membrane and external ear normal.      Left Ear: Tympanic membrane and external ear normal.      Nose: Nose normal.      Mouth/Throat:      Mouth: Mucous membranes are moist.     Eyes:      General: No scleral icterus.     Conjunctiva/sclera: Conjunctivae normal.      Pupils: Pupils are equal, round, and reactive to light.     Neck:      Thyroid: No thyromegaly.      Vascular: No JVD.     Cardiovascular:      Rate and Rhythm: Normal rate and regular rhythm.      Pulses: Normal pulses.      Heart sounds: Normal heart sounds. No murmur heard.  Pulmonary:      Effort: Pulmonary effort is normal.      Breath sounds: Normal breath sounds. No wheezing or rales.   Chest:      Chest wall: No tenderness.   Abdominal:      General: Bowel sounds are normal. There is no distension.      " Palpations: Abdomen is soft. There is no mass.      Tenderness: There is no abdominal tenderness. There is no guarding or rebound.     Musculoskeletal:         General: No tenderness or deformity. Normal range of motion.      Cervical back: Normal range of motion and neck supple.   Lymphadenopathy:      Cervical: No cervical adenopathy.     Skin:     General: Skin is warm and dry.      Capillary Refill: Capillary refill takes less than 2 seconds.      Findings: No erythema or rash.     Neurological:      General: No focal deficit present.      Mental Status: He is alert and oriented to person, place, and time.      Cranial Nerves: No cranial nerve deficit.      Deep Tendon Reflexes: Reflexes are normal and symmetric. Reflexes normal.     Psychiatric:         Mood and Affect: Mood normal.         Behavior: Behavior normal.         Thought Content: Thought content normal.         Judgment: Judgment normal.

## 2025-05-28 NOTE — ASSESSMENT & PLAN NOTE
Discussed with patient regarding job change and adjustment to this creating some issues with his overall status and emotions.  He is coping and not requesting medication but trying to go through this on his own without need for therapy and counseling and patient is understanding of his overall situation.

## 2025-06-11 ENCOUNTER — APPOINTMENT (OUTPATIENT)
Age: 35
End: 2025-06-11
Attending: FAMILY MEDICINE

## 2025-06-11 DIAGNOSIS — Z00.00 ANNUAL PHYSICAL EXAM: ICD-10-CM

## 2025-06-11 LAB
25(OH)D3 SERPL-MCNC: 44.1 NG/ML (ref 30–100)
FOLATE SERPL-MCNC: 10.4 NG/ML
VIT B12 SERPL-MCNC: 239 PG/ML (ref 180–914)

## 2025-06-11 PROCEDURE — 82746 ASSAY OF FOLIC ACID SERUM: CPT

## 2025-06-11 PROCEDURE — 82607 VITAMIN B-12: CPT

## 2025-06-11 PROCEDURE — 36415 COLL VENOUS BLD VENIPUNCTURE: CPT

## 2025-06-11 PROCEDURE — 82306 VITAMIN D 25 HYDROXY: CPT

## 2025-06-11 PROCEDURE — 84425 ASSAY OF VITAMIN B-1: CPT

## 2025-06-13 LAB — VIT B1 BLD-SCNC: 103.8 NMOL/L (ref 66.5–200)

## (undated) DEVICE — SYRINGE 10ML LL CONTROL TOP

## (undated) DEVICE — ANTI-FOG SOLUTION WITH FOAM PAD: Brand: DEVON

## (undated) DEVICE — 3000CC GUARDIAN II: Brand: GUARDIAN

## (undated) DEVICE — GLOVE PI ULTRA TOUCH SZ.6.5

## (undated) DEVICE — TUBING 1895522 5PK STRAIGHTSHOT TO XPS: Brand: STRAIGHTSHOT®

## (undated) DEVICE — NEEDLE 25G X 1 1/2

## (undated) DEVICE — TUBING SUCTION 5MM X 12 FT

## (undated) DEVICE — BLADE 1884080EM TRICUT 4MMX13CM M4 ROHS: Brand: FUSION®

## (undated) DEVICE — SPECIMEN CONTAINER STERILE PEEL PACK

## (undated) DEVICE — SHEATH 1912010 5PK 4MM/30DEG STORZ XOMED: Brand: ENDO-SCRUB®

## (undated) DEVICE — SHEATH 1912000 5PK 4MM/0DEG STORZ XOMED: Brand: ENDO-SCRUB®

## (undated) DEVICE — GLOVE INDICATOR PI UNDERGLOVE SZ 7 BLUE

## (undated) DEVICE — SCD SEQUENTIAL COMPRESSION COMFORT SLEEVE MEDIUM KNEE LENGTH: Brand: KENDALL SCD

## (undated) DEVICE — PATIENT TRACKER 9734887XOM NON-INVASIVE

## (undated) DEVICE — GAUZE SPONGES,USP TYPE VII GAUZE, 12 PLY: Brand: CURITY

## (undated) DEVICE — SPECIMEN SOCK - SHORT: Brand: MEDI-VAC

## (undated) DEVICE — NEURO PATTIES 1/2 X 3

## (undated) DEVICE — STERILE BETHLEHEM NASAL PACK: Brand: CARDINAL HEALTH

## (undated) DEVICE — GLOVE SRG BIOGEL ECLIPSE 7.5

## (undated) DEVICE — INSTRUMENT TRACKER 9733533XOM ENT 1PK